# Patient Record
Sex: FEMALE | Race: WHITE | NOT HISPANIC OR LATINO | Employment: UNEMPLOYED | URBAN - METROPOLITAN AREA
[De-identification: names, ages, dates, MRNs, and addresses within clinical notes are randomized per-mention and may not be internally consistent; named-entity substitution may affect disease eponyms.]

---

## 2023-03-25 DIAGNOSIS — E03.9 HYPOTHYROIDISM, UNSPECIFIED: ICD-10-CM

## 2023-04-12 PROBLEM — R23.4 OILY SKIN: Status: ACTIVE | Noted: 2023-04-12

## 2023-04-12 PROBLEM — L98.9 SKIN LESION: Status: ACTIVE | Noted: 2023-04-12

## 2023-04-12 PROBLEM — F41.8 DEPRESSION WITH ANXIETY: Status: ACTIVE | Noted: 2023-04-12

## 2023-04-12 PROBLEM — M79.651 PAIN OF RIGHT THIGH: Status: ACTIVE | Noted: 2023-04-12

## 2023-04-12 PROBLEM — J45.909 MILD ASTHMA (HHS-HCC): Status: ACTIVE | Noted: 2023-04-12

## 2023-04-12 PROBLEM — G47.00 INSOMNIA: Status: ACTIVE | Noted: 2023-04-12

## 2023-04-12 PROBLEM — M54.16 LUMBAR RADICULITIS: Status: ACTIVE | Noted: 2023-04-12

## 2023-04-12 PROBLEM — R93.7 ABNORMAL X-RAY OF LUMBAR SPINE: Status: ACTIVE | Noted: 2023-04-12

## 2023-04-12 PROBLEM — G47.33 OSA (OBSTRUCTIVE SLEEP APNEA): Status: ACTIVE | Noted: 2023-04-12

## 2023-04-12 PROBLEM — G25.89 SCAPULAR DYSKINESIS: Status: ACTIVE | Noted: 2023-04-12

## 2023-04-12 PROBLEM — R53.82 CHRONIC FATIGUE: Status: ACTIVE | Noted: 2023-04-12

## 2023-04-12 PROBLEM — R29.898 SHOULDER WEAKNESS: Status: ACTIVE | Noted: 2023-04-12

## 2023-04-12 PROBLEM — M25.512 LEFT SHOULDER PAIN: Status: ACTIVE | Noted: 2023-04-12

## 2023-04-12 PROBLEM — R29.898 COMPLAINTS OF WEAKNESS OF LOWER EXTREMITY: Status: ACTIVE | Noted: 2023-04-12

## 2023-04-12 PROBLEM — M47.816 LUMBAR SPONDYLOSIS: Status: ACTIVE | Noted: 2023-04-12

## 2023-04-12 PROBLEM — E78.5 HYPERLIPIDEMIA: Status: ACTIVE | Noted: 2023-04-12

## 2023-04-12 PROBLEM — E03.9 HYPOTHYROID: Status: ACTIVE | Noted: 2023-04-12

## 2023-04-12 PROBLEM — E66.9 OBESITY: Status: ACTIVE | Noted: 2023-04-12

## 2023-04-12 PROBLEM — E55.9 VITAMIN D DEFICIENCY: Status: ACTIVE | Noted: 2023-04-12

## 2023-04-12 PROBLEM — R14.0 ABDOMINAL BLOATING: Status: ACTIVE | Noted: 2023-04-12

## 2023-04-12 PROBLEM — E03.9 HYPOTHYROIDISM: Status: ACTIVE | Noted: 2023-04-12

## 2023-04-12 RX ORDER — LEVOTHYROXINE SODIUM 75 UG/1
1 TABLET ORAL
COMMUNITY
Start: 2016-11-20 | End: 2023-10-25 | Stop reason: SDUPTHER

## 2023-04-12 RX ORDER — FLUTICASONE PROPIONATE AND SALMETEROL 250; 50 UG/1; UG/1
1 POWDER RESPIRATORY (INHALATION)
COMMUNITY
Start: 2021-10-25

## 2023-04-12 RX ORDER — ROSUVASTATIN CALCIUM 20 MG/1
1 TABLET, COATED ORAL DAILY
COMMUNITY
Start: 2023-01-13 | End: 2023-07-17

## 2023-04-12 RX ORDER — ZOLPIDEM TARTRATE 6.25 MG/1
1 TABLET, FILM COATED, EXTENDED RELEASE ORAL NIGHTLY
COMMUNITY
Start: 2021-02-18 | End: 2023-04-13 | Stop reason: SDUPTHER

## 2023-04-12 RX ORDER — METHOCARBAMOL 500 MG/1
1-2 TABLET, FILM COATED ORAL EVERY 8 HOURS PRN
COMMUNITY
Start: 2023-03-25 | End: 2024-02-15 | Stop reason: ALTCHOICE

## 2023-04-12 RX ORDER — BUPROPION HYDROCHLORIDE 150 MG/1
1 TABLET ORAL
COMMUNITY
Start: 2020-10-12 | End: 2023-07-28 | Stop reason: SDUPTHER

## 2023-04-13 ENCOUNTER — OFFICE VISIT (OUTPATIENT)
Dept: PRIMARY CARE | Facility: CLINIC | Age: 63
End: 2023-04-13
Payer: COMMERCIAL

## 2023-04-13 VITALS
HEIGHT: 63 IN | SYSTOLIC BLOOD PRESSURE: 133 MMHG | BODY MASS INDEX: 32.67 KG/M2 | DIASTOLIC BLOOD PRESSURE: 82 MMHG | WEIGHT: 184.4 LBS | HEART RATE: 73 BPM | TEMPERATURE: 98.3 F

## 2023-04-13 DIAGNOSIS — E55.9 VITAMIN D DEFICIENCY: ICD-10-CM

## 2023-04-13 DIAGNOSIS — R53.82 CHRONIC FATIGUE: ICD-10-CM

## 2023-04-13 DIAGNOSIS — G47.33 OSA (OBSTRUCTIVE SLEEP APNEA): ICD-10-CM

## 2023-04-13 DIAGNOSIS — J45.20 MILD INTERMITTENT ASTHMA WITHOUT COMPLICATION (HHS-HCC): ICD-10-CM

## 2023-04-13 DIAGNOSIS — F51.01 PRIMARY INSOMNIA: Primary | ICD-10-CM

## 2023-04-13 DIAGNOSIS — E78.5 HYPERLIPIDEMIA, UNSPECIFIED HYPERLIPIDEMIA TYPE: ICD-10-CM

## 2023-04-13 DIAGNOSIS — E03.9 HYPOTHYROIDISM, UNSPECIFIED TYPE: ICD-10-CM

## 2023-04-13 PROCEDURE — 99214 OFFICE O/P EST MOD 30 MIN: CPT | Performed by: FAMILY MEDICINE

## 2023-04-13 PROCEDURE — 1036F TOBACCO NON-USER: CPT | Performed by: FAMILY MEDICINE

## 2023-04-13 RX ORDER — ZOLPIDEM TARTRATE 6.25 MG/1
6.25 TABLET, FILM COATED, EXTENDED RELEASE ORAL NIGHTLY
Qty: 30 TABLET | Refills: 2 | Status: SHIPPED | OUTPATIENT
Start: 2023-04-13 | End: 2023-07-28 | Stop reason: SDUPTHER

## 2023-04-13 ASSESSMENT — PATIENT HEALTH QUESTIONNAIRE - PHQ9
1. LITTLE INTEREST OR PLEASURE IN DOING THINGS: NOT AT ALL
2. FEELING DOWN, DEPRESSED OR HOPELESS: NOT AT ALL
SUM OF ALL RESPONSES TO PHQ9 QUESTIONS 1 & 2: 0

## 2023-04-13 ASSESSMENT — LIFESTYLE VARIABLES
HOW MANY STANDARD DRINKS CONTAINING ALCOHOL DO YOU HAVE ON A TYPICAL DAY: PATIENT DOES NOT DRINK
AUDIT-C TOTAL SCORE: 0
HOW OFTEN DO YOU HAVE A DRINK CONTAINING ALCOHOL: NEVER
HOW OFTEN DO YOU HAVE SIX OR MORE DRINKS ON ONE OCCASION: NEVER
SKIP TO QUESTIONS 9-10: 1

## 2023-04-13 ASSESSMENT — ENCOUNTER SYMPTOMS
OCCASIONAL FEELINGS OF UNSTEADINESS: 0
LOSS OF SENSATION IN FEET: 0
DEPRESSION: 0

## 2023-04-13 NOTE — PROGRESS NOTES
"Subjective   Patient ID: Deena Bello is a 62 y.o. female who presents for Follow-up (Here for a 3 mo fuv).    HPI   She denies any issues with her medications    She complains of snoring. She has tried a CPAP however, she has not found a mask that does not slide off her face.  She wakes up and her throat is raw. She is fatigued during the day. She is willing to see sleep medicine to see if can help    She continues on Zolpidem as it is controlling her symptoms well. She does not voice any side effects. She wakes up refreshed, not groggy but gets tired quickly during the day    Review of Systems    Objective   /82   Pulse 73   Temp 36.8 °C (98.3 °F)   Ht 1.6 m (5' 3\")   Wt 83.6 kg (184 lb 6.4 oz)   BMI 32.66 kg/m²     Physical Exam  Constitutional:       Appearance: Normal appearance.   Cardiovascular:      Rate and Rhythm: Normal rate and regular rhythm.      Pulses: Normal pulses.      Heart sounds: Normal heart sounds.   Pulmonary:      Effort: Pulmonary effort is normal.      Breath sounds: Normal breath sounds.   Musculoskeletal:         General: No tenderness. Normal range of motion.      Cervical back: Normal range of motion and neck supple.   Skin:     General: Skin is warm and dry.   Neurological:      Mental Status: She is alert and oriented to person, place, and time.   Psychiatric:         Mood and Affect: Mood normal.         Thought Content: Thought content normal.         Judgment: Judgment normal.         Assessment/Plan   Problem List Items Addressed This Visit          Nervous    Insomnia - Primary    Relevant Medications    zolpidem CR (Ambien CR) 6.25 mg ER tablet  Continue on Zolpidem 6.25 mg daily for sleep. Prescription printed.   OARRS checked. Risk and benefit ratio assessed. No Suspicious activity.   Referred to sleep medicine for salma      SALMA (obstructive sleep apnea)    Relevant Orders    Referral to Adult Sleep Medicine       Respiratory    Mild asthma  Controlled, no s/s or " exacerbations       Endocrine/Metabolic    Hypothyroid    Relevant Orders    CBC and Auto Differential    TSH with reflex to Free T4 if abnormal    Vitamin D deficiency    Relevant Orders    Vitamin D, Total       Other    Chronic fatigue    Hyperlipidemia    Relevant Orders    Comprehensive Metabolic Panel    Lipid Panel   Health Maintenance  Provided order for 6 month labs with fasting    Follow up in 3 months, unless a visit is needed prior.      Scribe Attestation  By signing my name below, IChaya Scribe   attest that this documentation has been prepared under the direction and in the presence of Petra Yoon DO.

## 2023-04-13 NOTE — PATIENT INSTRUCTIONS
Sleep Apnea  Provided referral to sleep medicine.     Insomnia  Continue on Zolpidem 6.25 mg daily for sleep. Prescription printed.   OARRS checked. Risk and benefit ratio assessed. No Suspicious activity.     Health Maintenance  Provided order for 6 month labs with fasting

## 2023-05-05 RX ORDER — LEVOTHYROXINE SODIUM 75 UG/1
TABLET ORAL
Qty: 45 TABLET | OUTPATIENT
Start: 2023-05-05

## 2023-05-22 DIAGNOSIS — F41.8 OTHER SPECIFIED ANXIETY DISORDERS: ICD-10-CM

## 2023-05-22 RX ORDER — BUPROPION HYDROCHLORIDE 150 MG/1
TABLET ORAL
Qty: 90 TABLET | Refills: 1 | OUTPATIENT
Start: 2023-05-22

## 2023-07-12 DIAGNOSIS — E78.5 HYPERLIPIDEMIA, UNSPECIFIED: ICD-10-CM

## 2023-07-17 RX ORDER — ROSUVASTATIN CALCIUM 20 MG/1
TABLET, COATED ORAL
Qty: 90 TABLET | Refills: 0 | Status: SHIPPED | OUTPATIENT
Start: 2023-07-17 | End: 2023-07-28 | Stop reason: SDUPTHER

## 2023-07-28 ENCOUNTER — OFFICE VISIT (OUTPATIENT)
Dept: PRIMARY CARE | Facility: CLINIC | Age: 63
End: 2023-07-28
Payer: COMMERCIAL

## 2023-07-28 VITALS
BODY MASS INDEX: 32.95 KG/M2 | OXYGEN SATURATION: 95 % | TEMPERATURE: 98.2 F | HEART RATE: 95 BPM | DIASTOLIC BLOOD PRESSURE: 82 MMHG | SYSTOLIC BLOOD PRESSURE: 137 MMHG | WEIGHT: 186 LBS

## 2023-07-28 DIAGNOSIS — E78.5 HYPERLIPIDEMIA, UNSPECIFIED: ICD-10-CM

## 2023-07-28 DIAGNOSIS — J45.20 MILD INTERMITTENT ASTHMA WITHOUT COMPLICATION (HHS-HCC): ICD-10-CM

## 2023-07-28 DIAGNOSIS — F41.8 DEPRESSION WITH ANXIETY: ICD-10-CM

## 2023-07-28 DIAGNOSIS — E03.9 HYPOTHYROIDISM, UNSPECIFIED TYPE: ICD-10-CM

## 2023-07-28 DIAGNOSIS — E78.5 HYPERLIPIDEMIA, UNSPECIFIED HYPERLIPIDEMIA TYPE: ICD-10-CM

## 2023-07-28 DIAGNOSIS — F51.01 PRIMARY INSOMNIA: Primary | ICD-10-CM

## 2023-07-28 PROBLEM — J32.9 CHRONIC SINUSITIS: Status: ACTIVE | Noted: 2023-07-28

## 2023-07-28 PROBLEM — E28.319 PREMATURE MENOPAUSE: Status: ACTIVE | Noted: 2023-07-28

## 2023-07-28 PROBLEM — J04.0 ACUTE LARYNGITIS: Status: ACTIVE | Noted: 2023-07-28

## 2023-07-28 PROBLEM — F51.04 CHRONIC INSOMNIA: Status: ACTIVE | Noted: 2023-07-28

## 2023-07-28 PROBLEM — E78.00 PURE HYPERCHOLESTEROLEMIA: Status: ACTIVE | Noted: 2023-07-28

## 2023-07-28 PROBLEM — N87.9 CERVICAL DYSPLASIA: Status: ACTIVE | Noted: 2023-07-28

## 2023-07-28 PROBLEM — J30.89 OTHER ALLERGIC RHINITIS: Status: ACTIVE | Noted: 2023-07-28

## 2023-07-28 PROBLEM — R53.81 OTHER MALAISE: Status: ACTIVE | Noted: 2023-07-28

## 2023-07-28 PROBLEM — J45.909 ASTHMA (HHS-HCC): Status: ACTIVE | Noted: 2023-07-28

## 2023-07-28 PROCEDURE — 99214 OFFICE O/P EST MOD 30 MIN: CPT | Performed by: FAMILY MEDICINE

## 2023-07-28 PROCEDURE — 1036F TOBACCO NON-USER: CPT | Performed by: FAMILY MEDICINE

## 2023-07-28 RX ORDER — ROSUVASTATIN CALCIUM 20 MG/1
20 TABLET, COATED ORAL DAILY
Qty: 90 TABLET | Refills: 0 | Status: SHIPPED | OUTPATIENT
Start: 2023-07-28 | End: 2023-11-09 | Stop reason: SDUPTHER

## 2023-07-28 RX ORDER — BUPROPION HYDROCHLORIDE 150 MG/1
150 TABLET ORAL
Qty: 90 TABLET | Refills: 0 | Status: SHIPPED | OUTPATIENT
Start: 2023-07-28 | End: 2023-11-09 | Stop reason: SDUPTHER

## 2023-07-28 RX ORDER — ZOLPIDEM TARTRATE 6.25 MG/1
6.25 TABLET, FILM COATED, EXTENDED RELEASE ORAL NIGHTLY
Qty: 30 TABLET | Refills: 2 | Status: SHIPPED | OUTPATIENT
Start: 2023-07-28 | End: 2023-10-27 | Stop reason: SDUPTHER

## 2023-07-28 ASSESSMENT — ENCOUNTER SYMPTOMS
DEPRESSION: 0
LOSS OF SENSATION IN FEET: 0
OCCASIONAL FEELINGS OF UNSTEADINESS: 0

## 2023-07-28 ASSESSMENT — PATIENT HEALTH QUESTIONNAIRE - PHQ9
SUM OF ALL RESPONSES TO PHQ9 QUESTIONS 1 AND 2: 0
1. LITTLE INTEREST OR PLEASURE IN DOING THINGS: NOT AT ALL
2. FEELING DOWN, DEPRESSED OR HOPELESS: NOT AT ALL

## 2023-07-28 NOTE — PROGRESS NOTES
Subjective   Patient ID: Deena Bello is a 62 y.o. female who presents for 3 MO FUV.    HPI here for med refill.  Has not done labs yet.  Will get fasting next week    Needs zolpidem CR refilled.  Sleeps about 4-6 hours uninterupted w medication.  Sometimes takes long to go back to sleep.  Most mornings feels refreshed     Anxiety and depression s/s in remission    Tolerating statin well, has controlled lipids in the past.      good energy level w thyroid med, will wait to fill when get labs back    Sees Dr Benz for gyn.  Needs mamm  Review of Systems    Objective   /82   Pulse 95   Temp 36.8 °C (98.2 °F)   Wt 84.4 kg (186 lb)   SpO2 95%   BMI 32.95 kg/m²     Physical Exam  Constitutional:       Appearance: Normal appearance.   HENT:      Head: Normocephalic.   Neck:      Vascular: No carotid bruit.   Cardiovascular:      Rate and Rhythm: Normal rate and regular rhythm.      Pulses: Normal pulses.      Heart sounds: Normal heart sounds.   Pulmonary:      Effort: Pulmonary effort is normal.      Breath sounds: Normal breath sounds.   Musculoskeletal:         General: Normal range of motion.      Right lower leg: No edema.      Left lower leg: No edema.   Skin:     General: Skin is warm and dry.   Neurological:      Mental Status: She is alert and oriented to person, place, and time.   Psychiatric:         Mood and Affect: Mood normal.         Thought Content: Thought content normal.         Judgment: Judgment normal.         Assessment/Plan   Problem List Items Addressed This Visit       Depression with anxiety    Relevant Medications    buPROPion XL (Wellbutrin XL) 150 mg 24 hr tablet    Hyperlipidemia    Hypothyroid    Insomnia - Primary    Relevant Medications    zolpidem CR (Ambien CR) 6.25 mg ER tablet    Mild asthma     Other Visit Diagnoses       Hyperlipidemia, unspecified        Relevant Medications    rosuvastatin (Crestor) 20 mg tablet        Refilled zolpidem cr, discussed good sleep  hygiene, reviewed OARRs, no signs of diversion or overuse of meds.  Pt is benefitting from med w/o siode effects  CSA/UDS jan 2023    Refilled all other meds except thyroid, waiting for labs and then send thyroid med as well pending results.      She will call dr Benz for mamm order and follow up for next pap etc    Follow up here 6  months or prn sooner

## 2023-07-29 ENCOUNTER — LAB (OUTPATIENT)
Dept: LAB | Facility: LAB | Age: 63
End: 2023-07-29
Payer: COMMERCIAL

## 2023-07-29 DIAGNOSIS — E78.5 HYPERLIPIDEMIA, UNSPECIFIED HYPERLIPIDEMIA TYPE: ICD-10-CM

## 2023-07-29 DIAGNOSIS — E03.9 HYPOTHYROIDISM, UNSPECIFIED TYPE: ICD-10-CM

## 2023-07-29 DIAGNOSIS — E55.9 VITAMIN D DEFICIENCY: ICD-10-CM

## 2023-07-29 LAB
ALANINE AMINOTRANSFERASE (SGPT) (U/L) IN SER/PLAS: 18 U/L (ref 7–45)
ALBUMIN (G/DL) IN SER/PLAS: 4.7 G/DL (ref 3.4–5)
ALKALINE PHOSPHATASE (U/L) IN SER/PLAS: 64 U/L (ref 33–136)
ANION GAP IN SER/PLAS: 13 MMOL/L (ref 10–20)
ASPARTATE AMINOTRANSFERASE (SGOT) (U/L) IN SER/PLAS: 18 U/L (ref 9–39)
BASOPHILS (10*3/UL) IN BLOOD BY AUTOMATED COUNT: 0.05 X10E9/L (ref 0–0.1)
BASOPHILS/100 LEUKOCYTES IN BLOOD BY AUTOMATED COUNT: 1.2 % (ref 0–2)
BILIRUBIN TOTAL (MG/DL) IN SER/PLAS: 0.5 MG/DL (ref 0–1.2)
CALCIDIOL (25 OH VITAMIN D3) (NG/ML) IN SER/PLAS: 38 NG/ML
CALCIUM (MG/DL) IN SER/PLAS: 9.8 MG/DL (ref 8.6–10.6)
CARBON DIOXIDE, TOTAL (MMOL/L) IN SER/PLAS: 27 MMOL/L (ref 21–32)
CHLORIDE (MMOL/L) IN SER/PLAS: 104 MMOL/L (ref 98–107)
CHOLESTEROL (MG/DL) IN SER/PLAS: 149 MG/DL (ref 0–199)
CHOLESTEROL IN HDL (MG/DL) IN SER/PLAS: 62.1 MG/DL
CHOLESTEROL/HDL RATIO: 2.4
CREATININE (MG/DL) IN SER/PLAS: 0.89 MG/DL (ref 0.5–1.05)
EOSINOPHILS (10*3/UL) IN BLOOD BY AUTOMATED COUNT: 0.55 X10E9/L (ref 0–0.7)
EOSINOPHILS/100 LEUKOCYTES IN BLOOD BY AUTOMATED COUNT: 12.8 % (ref 0–6)
ERYTHROCYTE DISTRIBUTION WIDTH (RATIO) BY AUTOMATED COUNT: 13.7 % (ref 11.5–14.5)
ERYTHROCYTE MEAN CORPUSCULAR HEMOGLOBIN CONCENTRATION (G/DL) BY AUTOMATED: 32.4 G/DL (ref 32–36)
ERYTHROCYTE MEAN CORPUSCULAR VOLUME (FL) BY AUTOMATED COUNT: 97 FL (ref 80–100)
ERYTHROCYTES (10*6/UL) IN BLOOD BY AUTOMATED COUNT: 4.55 X10E12/L (ref 4–5.2)
GFR FEMALE: 74 ML/MIN/1.73M2
GLUCOSE (MG/DL) IN SER/PLAS: 95 MG/DL (ref 74–99)
HEMATOCRIT (%) IN BLOOD BY AUTOMATED COUNT: 44.1 % (ref 36–46)
HEMOGLOBIN (G/DL) IN BLOOD: 14.3 G/DL (ref 12–16)
IMMATURE GRANULOCYTES/100 LEUKOCYTES IN BLOOD BY AUTOMATED COUNT: 0.2 % (ref 0–0.9)
LDL: 74 MG/DL (ref 0–99)
LEUKOCYTES (10*3/UL) IN BLOOD BY AUTOMATED COUNT: 4.3 X10E9/L (ref 4.4–11.3)
LYMPHOCYTES (10*3/UL) IN BLOOD BY AUTOMATED COUNT: 1.3 X10E9/L (ref 1.2–4.8)
LYMPHOCYTES/100 LEUKOCYTES IN BLOOD BY AUTOMATED COUNT: 30.2 % (ref 13–44)
MONOCYTES (10*3/UL) IN BLOOD BY AUTOMATED COUNT: 0.33 X10E9/L (ref 0.1–1)
MONOCYTES/100 LEUKOCYTES IN BLOOD BY AUTOMATED COUNT: 7.7 % (ref 2–10)
NEUTROPHILS (10*3/UL) IN BLOOD BY AUTOMATED COUNT: 2.06 X10E9/L (ref 1.2–7.7)
NEUTROPHILS/100 LEUKOCYTES IN BLOOD BY AUTOMATED COUNT: 47.9 % (ref 40–80)
NRBC (PER 100 WBCS) BY AUTOMATED COUNT: 0 /100 WBC (ref 0–0)
PLATELETS (10*3/UL) IN BLOOD AUTOMATED COUNT: 279 X10E9/L (ref 150–450)
POTASSIUM (MMOL/L) IN SER/PLAS: 4.5 MMOL/L (ref 3.5–5.3)
PROTEIN TOTAL: 7 G/DL (ref 6.4–8.2)
SODIUM (MMOL/L) IN SER/PLAS: 139 MMOL/L (ref 136–145)
THYROTROPIN (MIU/L) IN SER/PLAS BY DETECTION LIMIT <= 0.05 MIU/L: 0.47 MIU/L (ref 0.44–3.98)
TRIGLYCERIDE (MG/DL) IN SER/PLAS: 67 MG/DL (ref 0–149)
UREA NITROGEN (MG/DL) IN SER/PLAS: 12 MG/DL (ref 6–23)
VLDL: 13 MG/DL (ref 0–40)

## 2023-07-29 PROCEDURE — 84443 ASSAY THYROID STIM HORMONE: CPT

## 2023-07-29 PROCEDURE — 80061 LIPID PANEL: CPT

## 2023-07-29 PROCEDURE — 36415 COLL VENOUS BLD VENIPUNCTURE: CPT

## 2023-07-29 PROCEDURE — 85025 COMPLETE CBC W/AUTO DIFF WBC: CPT

## 2023-07-29 PROCEDURE — 82306 VITAMIN D 25 HYDROXY: CPT

## 2023-07-29 PROCEDURE — 80053 COMPREHEN METABOLIC PANEL: CPT

## 2023-10-20 DIAGNOSIS — F51.01 PRIMARY INSOMNIA: ICD-10-CM

## 2023-10-25 DIAGNOSIS — E03.9 HYPOTHYROIDISM, UNSPECIFIED TYPE: Primary | ICD-10-CM

## 2023-10-25 RX ORDER — LEVOTHYROXINE SODIUM 75 UG/1
75 TABLET ORAL
Qty: 90 TABLET | Refills: 2 | Status: SHIPPED | OUTPATIENT
Start: 2023-10-25 | End: 2023-11-09 | Stop reason: SDUPTHER

## 2023-10-26 RX ORDER — ZOLPIDEM TARTRATE 6.25 MG/1
6.25 TABLET, FILM COATED, EXTENDED RELEASE ORAL NIGHTLY
Qty: 30 TABLET | Refills: 2 | OUTPATIENT
Start: 2023-10-26 | End: 2024-01-24

## 2023-10-27 DIAGNOSIS — F41.8 DEPRESSION WITH ANXIETY: ICD-10-CM

## 2023-10-27 DIAGNOSIS — F51.01 PRIMARY INSOMNIA: ICD-10-CM

## 2023-10-27 RX ORDER — BUPROPION HYDROCHLORIDE 150 MG/1
150 TABLET ORAL
Qty: 90 TABLET | Refills: 0 | OUTPATIENT
Start: 2023-10-27 | End: 2024-01-25

## 2023-10-27 RX ORDER — ZOLPIDEM TARTRATE 6.25 MG/1
6.25 TABLET, FILM COATED, EXTENDED RELEASE ORAL NIGHTLY
Qty: 14 TABLET | Refills: 0 | Status: SHIPPED | OUTPATIENT
Start: 2023-10-27 | End: 2023-11-09 | Stop reason: SDUPTHER

## 2023-10-27 NOTE — TELEPHONE ENCOUNTER
On patients allergy list that she can not take this med. Spoke to patient she is not taking medicine Wellbutrin anymore

## 2023-11-09 ENCOUNTER — OFFICE VISIT (OUTPATIENT)
Dept: PRIMARY CARE | Facility: CLINIC | Age: 63
End: 2023-11-09
Payer: COMMERCIAL

## 2023-11-09 VITALS
RESPIRATION RATE: 16 BRPM | SYSTOLIC BLOOD PRESSURE: 128 MMHG | OXYGEN SATURATION: 94 % | HEIGHT: 63 IN | BODY MASS INDEX: 33.56 KG/M2 | WEIGHT: 189.4 LBS | HEART RATE: 73 BPM | DIASTOLIC BLOOD PRESSURE: 70 MMHG

## 2023-11-09 DIAGNOSIS — E78.5 HYPERLIPIDEMIA, UNSPECIFIED: ICD-10-CM

## 2023-11-09 DIAGNOSIS — E03.9 HYPOTHYROIDISM, UNSPECIFIED TYPE: ICD-10-CM

## 2023-11-09 DIAGNOSIS — E78.2 MIXED HYPERLIPIDEMIA: ICD-10-CM

## 2023-11-09 DIAGNOSIS — J45.20 MILD INTERMITTENT ASTHMA WITHOUT COMPLICATION (HHS-HCC): ICD-10-CM

## 2023-11-09 DIAGNOSIS — F41.8 DEPRESSION WITH ANXIETY: ICD-10-CM

## 2023-11-09 DIAGNOSIS — D72.819 LEUKOPENIA, UNSPECIFIED TYPE: Primary | ICD-10-CM

## 2023-11-09 DIAGNOSIS — F51.01 PRIMARY INSOMNIA: ICD-10-CM

## 2023-11-09 PROCEDURE — 99214 OFFICE O/P EST MOD 30 MIN: CPT | Performed by: FAMILY MEDICINE

## 2023-11-09 PROCEDURE — 1036F TOBACCO NON-USER: CPT | Performed by: FAMILY MEDICINE

## 2023-11-09 RX ORDER — ROSUVASTATIN CALCIUM 20 MG/1
20 TABLET, COATED ORAL DAILY
Qty: 90 TABLET | Refills: 0 | Status: SHIPPED | OUTPATIENT
Start: 2023-11-09 | End: 2024-02-15 | Stop reason: SDUPTHER

## 2023-11-09 RX ORDER — LEVOTHYROXINE SODIUM 75 UG/1
75 TABLET ORAL
Qty: 90 TABLET | Refills: 0 | Status: SHIPPED | OUTPATIENT
Start: 2023-11-09 | End: 2024-02-15 | Stop reason: SDUPTHER

## 2023-11-09 RX ORDER — BUPROPION HYDROCHLORIDE 150 MG/1
150 TABLET ORAL
Qty: 90 TABLET | Refills: 0 | Status: SHIPPED | OUTPATIENT
Start: 2023-11-09 | End: 2024-02-07

## 2023-11-09 RX ORDER — ZOLPIDEM TARTRATE 6.25 MG/1
6.25 TABLET, FILM COATED, EXTENDED RELEASE ORAL NIGHTLY
Qty: 30 TABLET | Refills: 2 | Status: SHIPPED | OUTPATIENT
Start: 2023-11-09 | End: 2024-02-08 | Stop reason: SDUPTHER

## 2023-11-09 ASSESSMENT — COLUMBIA-SUICIDE SEVERITY RATING SCALE - C-SSRS
6. HAVE YOU EVER DONE ANYTHING, STARTED TO DO ANYTHING, OR PREPARED TO DO ANYTHING TO END YOUR LIFE?: NO
2. HAVE YOU ACTUALLY HAD ANY THOUGHTS OF KILLING YOURSELF?: NO
1. IN THE PAST MONTH, HAVE YOU WISHED YOU WERE DEAD OR WISHED YOU COULD GO TO SLEEP AND NOT WAKE UP?: NO

## 2023-11-09 ASSESSMENT — ENCOUNTER SYMPTOMS
DEPRESSION: 0
LOSS OF SENSATION IN FEET: 0
OCCASIONAL FEELINGS OF UNSTEADINESS: 0

## 2023-11-09 ASSESSMENT — PATIENT HEALTH QUESTIONNAIRE - PHQ9
2. FEELING DOWN, DEPRESSED OR HOPELESS: NOT AT ALL
SUM OF ALL RESPONSES TO PHQ9 QUESTIONS 1 AND 2: 0
1. LITTLE INTEREST OR PLEASURE IN DOING THINGS: NOT AT ALL

## 2023-11-09 NOTE — PROGRESS NOTES
"Subjective   Patient ID: Deena Bello is a 63 y.o. female who presents for Follow-up.    HPI   The patient presents to the clinic for a 3-month follow-up and medication refills. She has past medical history of hypothyroidism, depression with anxiety, and hyperlipidemia.    She has been compliant with medication and denies any side-effects to current medication.    The patient's most recent labs were done on 07/29/2023. Her most recent mammogram was done on 04/06/2022.    She reports that she was previously diagnosed with arthritis in her bilateral lower extremities. She states she feels \"circulation issues\" in her lower bilateral extremities. Specifically, she reports coldness and pressure in her lower bilateral extremities. She denies any whiteness in her extremities (characteristic of Raynaud's disease). She denies any back-related issues.    Bp good rnage.  No cp or sob    Review of Systems    Objective   /70 (BP Location: Right arm, Patient Position: Sitting, BP Cuff Size: Large adult)   Pulse 73   Resp 16   Ht 1.6 m (5' 3\")   Wt 85.9 kg (189 lb 6.4 oz)   SpO2 94%   BMI 33.55 kg/m²     Physical Exam  Neck:      Vascular: No carotid bruit.   Cardiovascular:      Rate and Rhythm: Normal rate and regular rhythm.      Pulses: Normal pulses.      Heart sounds: Normal heart sounds.      Comments: Dorsalis pedis and post tibial pulses strong and symmetrical bilat lower extremities and good capillary refill of nail beds  Pulmonary:      Effort: Pulmonary effort is normal.      Breath sounds: Normal breath sounds.   Musculoskeletal:         General: Normal range of motion.      Cervical back: Normal range of motion.      Right lower leg: No edema.      Left lower leg: No edema.   Skin:     General: Skin is warm and dry.   Neurological:      General: No focal deficit present.      Mental Status: She is alert and oriented to person, place, and time.   Psychiatric:         Mood and Affect: Mood normal.         " Behavior: Behavior normal.         Thought Content: Thought content normal.         Judgment: Judgment normal.         Assessment/Plan   Problem List Items Addressed This Visit             ICD-10-CM    Depression with anxiety F41.8    Relevant Medications    buPROPion XL (Wellbutrin XL) 150 mg 24 hr tablet    Hyperlipidemia E78.5    Relevant Orders    Comprehensive Metabolic Panel    Lipid Panel    Insomnia G47.00    Relevant Medications    zolpidem CR (Ambien CR) 6.25 mg ER tablet    Mild asthma J45.909    Hypothyroidism E03.9    Relevant Medications    levothyroxine (Synthroid, Levoxyl) 75 mcg tablet     Other Visit Diagnoses         Codes    Leukopenia, unspecified type    -  Primary D72.819    Relevant Orders    CBC and Auto Differential    Hyperlipidemia, unspecified     E78.5    Relevant Medications    rosuvastatin (Crestor) 20 mg tablet    Other Relevant Orders    Comprehensive Metabolic Panel    Lipid Panel               Labs (CBC, CMP, lipid panel) were ordered for the patient to complete before her next visit in January 2024.    The patient's current medication list was updated. She received refills for current medication (buproprion  mg, levothyroxine 75 mcg, rosuvastatin 20 mg, zolpidem 6.25 mg). She was advised to continue using medication as previously directed.      No circulation issues detected in feet, good capillary refill and good pulses.  No sensory deficit.  She will consider seeing podiatrist if s/s persist    She will follow-up in January 2024.    Scribe Attestation  By signing my name below, IMarah Scribe   attest that this documentation has been prepared under the direction and in the presence of Petra Yoon DO.

## 2024-01-06 ENCOUNTER — LAB (OUTPATIENT)
Dept: LAB | Facility: LAB | Age: 64
End: 2024-01-06
Payer: COMMERCIAL

## 2024-01-06 DIAGNOSIS — L70.0 ACNE VULGARIS: Primary | ICD-10-CM

## 2024-01-06 LAB
ALBUMIN SERPL BCP-MCNC: 4.3 G/DL (ref 3.4–5)
ALP SERPL-CCNC: 61 U/L (ref 33–136)
ALT SERPL W P-5'-P-CCNC: 18 U/L (ref 7–45)
AST SERPL W P-5'-P-CCNC: 17 U/L (ref 9–39)
BILIRUB DIRECT SERPL-MCNC: 0.1 MG/DL (ref 0–0.3)
BILIRUB SERPL-MCNC: 0.4 MG/DL (ref 0–1.2)
CHOLEST SERPL-MCNC: 155 MG/DL (ref 0–199)
ERYTHROCYTE [DISTWIDTH] IN BLOOD BY AUTOMATED COUNT: 13.5 % (ref 11.5–14.5)
HCT VFR BLD AUTO: 44.2 % (ref 36–46)
HGB BLD-MCNC: 14.8 G/DL (ref 12–16)
MCH RBC QN AUTO: 32.7 PG (ref 26–34)
MCHC RBC AUTO-ENTMCNC: 33.5 G/DL (ref 32–36)
MCV RBC AUTO: 98 FL (ref 80–100)
NRBC BLD-RTO: 0 /100 WBCS (ref 0–0)
PLATELET # BLD AUTO: 270 X10*3/UL (ref 150–450)
PROT SERPL-MCNC: 6.9 G/DL (ref 6.4–8.2)
RBC # BLD AUTO: 4.52 X10*6/UL (ref 4–5.2)
TRIGL SERPL-MCNC: 84 MG/DL (ref 0–149)
WBC # BLD AUTO: 4.4 X10*3/UL (ref 4.4–11.3)

## 2024-01-06 PROCEDURE — 84478 ASSAY OF TRIGLYCERIDES: CPT

## 2024-01-06 PROCEDURE — 85027 COMPLETE CBC AUTOMATED: CPT

## 2024-01-06 PROCEDURE — 80076 HEPATIC FUNCTION PANEL: CPT

## 2024-01-06 PROCEDURE — 36415 COLL VENOUS BLD VENIPUNCTURE: CPT

## 2024-01-06 PROCEDURE — 82465 ASSAY BLD/SERUM CHOLESTEROL: CPT

## 2024-02-05 DIAGNOSIS — F41.8 DEPRESSION WITH ANXIETY: ICD-10-CM

## 2024-02-07 RX ORDER — BUPROPION HYDROCHLORIDE 150 MG/1
150 TABLET ORAL
Qty: 30 TABLET | Refills: 0 | Status: SHIPPED | OUTPATIENT
Start: 2024-02-07 | End: 2024-02-15 | Stop reason: SDUPTHER

## 2024-02-08 DIAGNOSIS — F51.01 PRIMARY INSOMNIA: ICD-10-CM

## 2024-02-08 RX ORDER — ZOLPIDEM TARTRATE 6.25 MG/1
6.25 TABLET, FILM COATED, EXTENDED RELEASE ORAL NIGHTLY
Qty: 10 TABLET | Refills: 0 | Status: SHIPPED | OUTPATIENT
Start: 2024-02-08 | End: 2024-02-15 | Stop reason: SDUPTHER

## 2024-02-15 ENCOUNTER — OFFICE VISIT (OUTPATIENT)
Dept: PRIMARY CARE | Facility: CLINIC | Age: 64
End: 2024-02-15
Payer: COMMERCIAL

## 2024-02-15 VITALS
SYSTOLIC BLOOD PRESSURE: 147 MMHG | BODY MASS INDEX: 32.44 KG/M2 | TEMPERATURE: 97.3 F | WEIGHT: 190 LBS | OXYGEN SATURATION: 94 % | DIASTOLIC BLOOD PRESSURE: 89 MMHG | RESPIRATION RATE: 18 BRPM | HEART RATE: 74 BPM | HEIGHT: 64 IN

## 2024-02-15 DIAGNOSIS — G47.09 OTHER INSOMNIA: ICD-10-CM

## 2024-02-15 DIAGNOSIS — E03.9 HYPOTHYROIDISM, UNSPECIFIED TYPE: ICD-10-CM

## 2024-02-15 DIAGNOSIS — F51.01 PRIMARY INSOMNIA: Primary | ICD-10-CM

## 2024-02-15 DIAGNOSIS — R03.0 ELEVATED BLOOD PRESSURE READING: ICD-10-CM

## 2024-02-15 DIAGNOSIS — F41.8 DEPRESSION WITH ANXIETY: ICD-10-CM

## 2024-02-15 DIAGNOSIS — J45.20 MILD INTERMITTENT ASTHMA WITHOUT COMPLICATION (HHS-HCC): ICD-10-CM

## 2024-02-15 DIAGNOSIS — E78.5 HYPERLIPIDEMIA, UNSPECIFIED: ICD-10-CM

## 2024-02-15 LAB
AMPHETAMINES UR QL SCN: NORMAL
BARBITURATES UR QL SCN: NORMAL
BENZODIAZ UR QL SCN: NORMAL
BENZODIAZ UR QL SCN: NORMAL
BZE UR QL SCN: NORMAL
CANNABINOIDS UR QL SCN: NORMAL
FENTANYL+NORFENTANYL UR QL SCN: NORMAL
OPIATES UR QL SCN: NORMAL
OXYCODONE+OXYMORPHONE UR QL SCN: NORMAL
PCP UR QL SCN: NORMAL

## 2024-02-15 PROCEDURE — 1036F TOBACCO NON-USER: CPT | Performed by: FAMILY MEDICINE

## 2024-02-15 PROCEDURE — 99214 OFFICE O/P EST MOD 30 MIN: CPT | Performed by: FAMILY MEDICINE

## 2024-02-15 PROCEDURE — 80307 DRUG TEST PRSMV CHEM ANLYZR: CPT

## 2024-02-15 RX ORDER — LEVOTHYROXINE SODIUM 75 UG/1
75 TABLET ORAL
Qty: 90 TABLET | Refills: 0 | Status: SHIPPED | OUTPATIENT
Start: 2024-02-15 | End: 2024-05-16 | Stop reason: SDUPTHER

## 2024-02-15 RX ORDER — ISOTRETINOIN 10 MG/1
10 CAPSULE, LIQUID FILLED ORAL
COMMUNITY
Start: 2024-01-05

## 2024-02-15 RX ORDER — BUPROPION HYDROCHLORIDE 150 MG/1
150 TABLET ORAL
Qty: 90 TABLET | Refills: 1 | Status: SHIPPED | OUTPATIENT
Start: 2024-02-15

## 2024-02-15 RX ORDER — ROSUVASTATIN CALCIUM 20 MG/1
20 TABLET, COATED ORAL DAILY
Qty: 90 TABLET | Refills: 1 | Status: SHIPPED | OUTPATIENT
Start: 2024-02-15

## 2024-02-15 RX ORDER — KETOCONAZOLE 20 MG/G
CREAM TOPICAL 2 TIMES DAILY
COMMUNITY
Start: 2023-10-06 | End: 2024-02-15 | Stop reason: ALTCHOICE

## 2024-02-15 RX ORDER — ZOLPIDEM TARTRATE 6.25 MG/1
6.25 TABLET, FILM COATED, EXTENDED RELEASE ORAL NIGHTLY
Qty: 30 TABLET | Refills: 2 | Status: SHIPPED | OUTPATIENT
Start: 2024-02-15 | End: 2024-05-16 | Stop reason: SDUPTHER

## 2024-02-15 ASSESSMENT — PAIN SCALES - GENERAL: PAINLEVEL: 0-NO PAIN

## 2024-02-15 NOTE — PROGRESS NOTES
"Subjective   Patient ID: Deena Bello is a 63 y.o. female who presents for 4 month f/up.    HPI   The patient presents to the clinic for a 4-month follow-up. She has past medical history of hyperlipidemia, chronic sinusitis, hypothyroidism, depression, anxiety, lumbar radiculitis, lumbar spondylosis, asthma, chronic insomnia, chronic fatigue, and SALMA.    Her blood pressure (147/89) was slightly elevated when checked in the clinic today. She is not taking any prescription medication for hypertension at this time. She states that she does have a blood pressure monitor at home. Her blood pressure (142/88) remained elevated when re-checked in the clinic towards the end of the visit. She has family history of CHF with her mother.    She reports that she is taking Claravis 10 mg (3 times per week) under the guidance of a dermatologist (Dr. Melendez). Had TC and TG drawn and hepatic function panel so do not need to do labs for her statin at this time    Her most recent labs were done in July 2023. She continues on rosuvastatin 20 mg for treatment of hyperlipidemia. She states that this medication is working well and she denies any side-effects. She also continues on levothyroxine 75 mcg (8 tablets per week) for treatment of hypothyroidisim. She states that levothyroxine medication is working well and she denies any side-effects.    She also continues on bupropion  mg for treatment of depression/anxiety symptoms. She denies any side-effects to her bupropion XL prescription.     She continues to visit her OB-GYN (Dr. Benz) regularly. Her most recent mammogram screening was done in April 2022. Her most recent gynecological exam (Pap smear) was done in February 2022.  She is scheduled March 1    Continues on ambien CR, works well for her, denies side effects    Review of Systems    Objective   /89   Pulse 74   Temp 36.3 °C (97.3 °F)   Resp 18   Ht 1.613 m (5' 3.5\")   Wt 86.2 kg (190 lb)   SpO2 94%   BMI " 33.13 kg/m²     Physical Exam  Constitutional:       Appearance: Normal appearance.   Neck:      Vascular: No carotid bruit.   Cardiovascular:      Rate and Rhythm: Normal rate and regular rhythm.      Pulses: Normal pulses.      Heart sounds: Normal heart sounds.   Pulmonary:      Effort: Pulmonary effort is normal.      Breath sounds: Normal breath sounds.   Musculoskeletal:         General: Normal range of motion.      Cervical back: Normal range of motion.      Right lower leg: No edema.      Left lower leg: No edema.   Skin:     General: Skin is warm and dry.   Neurological:      Mental Status: She is alert and oriented to person, place, and time.   Psychiatric:         Mood and Affect: Mood normal.         Thought Content: Thought content normal.         Judgment: Judgment normal.         Assessment/Plan   Problem List Items Addressed This Visit             ICD-10-CM    Depression with anxiety F41.8    Relevant Medications    buPROPion XL (Wellbutrin XL) 150 mg 24 hr tablet    Insomnia - Primary G47.00    Relevant Medications    zolpidem CR (Ambien CR) 6.25 mg ER tablet    Other Relevant Orders    Drug Screen, Urine With Reflex to Confirmation    Benzodiazepine Screen, Urine    Hypothyroidism E03.9    Relevant Medications    levothyroxine (Synthroid, Levoxyl) 75 mcg tablet    Asthma J45.909     Stable, asymptomatic through winter, usually starts mdi in spring w allergies.            Other Visit Diagnoses         Codes    Hyperlipidemia, unspecified     E78.5    Relevant Medications    rosuvastatin (Crestor) 20 mg tablet    Elevated blood pressure reading     R03.0               Her medication list was updated. The patient received refills for current medication (bupropion  mg, levothyroxine 75 mcg, rosuvastatin 10 mg, zolpidem CR 6.25 mg). She was instructed to continue taking medication as previously directed. In regards to her zolpidem CR prescription, OARRS were reviewed. Risk and benefit ratio was  assessed. No suspicious activity was observed. A UDS/CSA was also conducted on the patient in the clinic today.    In regards to concerns with elevated blood pressure, the patient was advised to monitor her blood pressure at home for the next 2-3 weeks. She was advised to contact the clinic for further evaluation if her at-home BP readings are at/above 140/90. She was instructed to try eating a low-sodium diet with moderate amounts of aerobic exercise. Continue monitoring symptoms for improvement/exacerbation.    She will follow-up in 3 months, unless otherwise needed.    Scribe Attestation  By signing my name below, I, Nazia Ortiz   attest that this documentation has been prepared under the direction and in the presence of Petra Yoon DO.

## 2024-03-01 ENCOUNTER — OFFICE VISIT (OUTPATIENT)
Dept: OBSTETRICS AND GYNECOLOGY | Facility: CLINIC | Age: 64
End: 2024-03-01
Payer: COMMERCIAL

## 2024-03-01 VITALS
BODY MASS INDEX: 33.84 KG/M2 | DIASTOLIC BLOOD PRESSURE: 72 MMHG | WEIGHT: 191 LBS | SYSTOLIC BLOOD PRESSURE: 110 MMHG | HEIGHT: 63 IN

## 2024-03-01 DIAGNOSIS — Z12.31 BREAST CANCER SCREENING BY MAMMOGRAM: ICD-10-CM

## 2024-03-01 DIAGNOSIS — B37.2 YEAST DERMATITIS: ICD-10-CM

## 2024-03-01 DIAGNOSIS — Z01.419 WELL WOMAN EXAM WITH ROUTINE GYNECOLOGICAL EXAM: Primary | ICD-10-CM

## 2024-03-01 PROCEDURE — 1036F TOBACCO NON-USER: CPT | Performed by: OBSTETRICS & GYNECOLOGY

## 2024-03-01 PROCEDURE — 87624 HPV HI-RISK TYP POOLED RSLT: CPT

## 2024-03-01 PROCEDURE — 88175 CYTOPATH C/V AUTO FLUID REDO: CPT

## 2024-03-01 PROCEDURE — 99396 PREV VISIT EST AGE 40-64: CPT | Performed by: OBSTETRICS & GYNECOLOGY

## 2024-03-01 RX ORDER — CLOTRIMAZOLE AND BETAMETHASONE DIPROPIONATE 10; .64 MG/G; MG/G
1 CREAM TOPICAL 2 TIMES DAILY
Qty: 30 G | Refills: 0 | Status: SHIPPED | OUTPATIENT
Start: 2024-03-01 | End: 2024-04-30

## 2024-03-01 NOTE — PROGRESS NOTES
Chief Complaint   Patient presents with    Annual Exam     Annual Exam with Pap Smear and Order for Mammogram    [id number]    No complaints.    Chaperone declined.     Patient presents for annual preventative exam.  Last seen 2 years ago.  Has no gynecologic concerns other than gets intermittent rash which is itchy under the breasts.    REVIEW OF SYSTEMS    Please see HPI for reported pertinent positives, which would supersede this ROS    Constitutional:  Denies fever, chills, wt gain or loss, fatigue    Genito-Urinary:  Denies genital lesion or sores, vaginal dryness, itching  or pain.  No abnormal vaginal discharge or unexplained vaginal bleeding.  No dysuria, urinary incontinence or frequency.  Denies pelvic pain, dysmenorrhea or dyspareunia.    Eyes:  Denies vision changes, dryness  ENT:  No hearing loss, sinus pain or congestion, nosebleeds  Cardiovascular:  No chest pain or palpitations  Respiratory:  No SOB, cough, wheezing  GI:  No Nausea, vomiting, diarrhea, constipation, abdominal pain  Musculoskeletal:  No new back pain. joint pain, peripheral edema  Skin:  No rash or skin lesion  Neurologic:  No HA, numbness or dizziness  Psychiatric:  No new anxiety or depression  Endocrine:  No loss of hair or hirsutism  Hematologic/lymphatic:  No swollen lymph nodes.  No reported tendency for easy bruising or bleeding    Patient admits to no other systemic complaints      Vitals:    24 1406   BP: 110/72       PHYSICAL EXAM:    PSYCH:  Pt is alert, oriented and cooperative    SKIN: warm, dry, w/o lesion    HEAD AND FACE:  External inspection of eyes, ears, functional cranial nerves normal and intact    THYROID:  No thyromegaly    CARDIOVASCULAR:  Warm and well Perfused    PULMONARY:  No respiratory distress    ABDOMEN:  soft, nontender.  No mass or organomegaly.    Under The right breast is classic yeast dermatitis.  Left side not so much.  She states that she does get the symptoms intermittently from time to  time sometimes unilateral, sometimes both.    BREAST:  Breasts are symmetric to inspection and palpation.  No mass palpable bilaterally.  There is no axillary lymphadenopathy    PELVIC:    External genitalia, urethra, perianal region normal to inspection and palpation if indicated.  No inguinal LA    Vagina without lesions.    Cervix seen and visually normal      Bimanual exam:      No pelvic mass palpable    Uterus nontender, midline in pelvis    No adnexal masses or tenderness    Assessment/Plan    Diagnoses and all orders for this visit:  Well woman exam with routine gynecological exam  -     THINPREP PAP TEST  Breast cancer screening by mammogram  -     BI mammo bilateral screening tomosynthesis; Future  Yeast dermatitis  -     clotrimazole-betamethasone (Lotrisone) cream; Apply 1 Application topically 2 times a day.

## 2024-03-15 ENCOUNTER — HOSPITAL ENCOUNTER (OUTPATIENT)
Dept: RADIOLOGY | Facility: CLINIC | Age: 64
Discharge: HOME | End: 2024-03-15
Payer: COMMERCIAL

## 2024-03-15 VITALS — WEIGHT: 190.04 LBS | BODY MASS INDEX: 33.67 KG/M2 | HEIGHT: 63 IN

## 2024-03-15 DIAGNOSIS — Z12.31 BREAST CANCER SCREENING BY MAMMOGRAM: ICD-10-CM

## 2024-03-15 PROCEDURE — 77067 SCR MAMMO BI INCL CAD: CPT | Performed by: RADIOLOGY

## 2024-03-15 PROCEDURE — 77067 SCR MAMMO BI INCL CAD: CPT

## 2024-03-15 PROCEDURE — 77063 BREAST TOMOSYNTHESIS BI: CPT | Performed by: RADIOLOGY

## 2024-05-16 ENCOUNTER — OFFICE VISIT (OUTPATIENT)
Dept: PRIMARY CARE | Facility: CLINIC | Age: 64
End: 2024-05-16
Payer: COMMERCIAL

## 2024-05-16 VITALS
TEMPERATURE: 97.4 F | BODY MASS INDEX: 33.49 KG/M2 | HEIGHT: 63 IN | WEIGHT: 189 LBS | DIASTOLIC BLOOD PRESSURE: 78 MMHG | SYSTOLIC BLOOD PRESSURE: 118 MMHG | OXYGEN SATURATION: 95 % | RESPIRATION RATE: 18 BRPM | HEART RATE: 91 BPM

## 2024-05-16 DIAGNOSIS — F41.8 DEPRESSION WITH ANXIETY: ICD-10-CM

## 2024-05-16 DIAGNOSIS — J45.20 MILD INTERMITTENT ASTHMA WITHOUT COMPLICATION (HHS-HCC): ICD-10-CM

## 2024-05-16 DIAGNOSIS — F51.01 PRIMARY INSOMNIA: ICD-10-CM

## 2024-05-16 DIAGNOSIS — E78.5 HYPERLIPIDEMIA, UNSPECIFIED HYPERLIPIDEMIA TYPE: Primary | ICD-10-CM

## 2024-05-16 DIAGNOSIS — E03.9 HYPOTHYROIDISM, UNSPECIFIED TYPE: ICD-10-CM

## 2024-05-16 DIAGNOSIS — R03.0 ELEVATED BLOOD PRESSURE READING: ICD-10-CM

## 2024-05-16 PROCEDURE — 99214 OFFICE O/P EST MOD 30 MIN: CPT | Performed by: FAMILY MEDICINE

## 2024-05-16 PROCEDURE — 1036F TOBACCO NON-USER: CPT | Performed by: FAMILY MEDICINE

## 2024-05-16 RX ORDER — LEVOTHYROXINE SODIUM 75 UG/1
75 TABLET ORAL
Qty: 90 TABLET | Refills: 0 | Status: SHIPPED | OUTPATIENT
Start: 2024-05-16

## 2024-05-16 RX ORDER — ZOLPIDEM TARTRATE 6.25 MG/1
6.25 TABLET, FILM COATED, EXTENDED RELEASE ORAL NIGHTLY
Qty: 30 TABLET | Refills: 2 | Status: SHIPPED | OUTPATIENT
Start: 2024-05-16 | End: 2024-08-14

## 2024-05-16 ASSESSMENT — PAIN SCALES - GENERAL: PAINLEVEL: 0-NO PAIN

## 2024-05-16 NOTE — PROGRESS NOTES
"Subjective   Patient ID: Deena Bello is a 63 y.o. female who presents for 3 month f/up.    HPI   The patient presents to the clinic for a 3-month follow-up. She has past medical history of HLD, chronic sinusitis, hypothyroidism, vitamin D deficiency, cervical dysplasia, depression with anxiety, left shoulder pain, scapular dyskinesis, lumbar radiculitis, lumbar spondylosis, asthma, chronic insomnia, chronic fatigue, and SALMA.    She continues on rosuvastatin 20 mg daily for treatment of hyperlipidemia. She also continues on levothyroxine 75 mcg daily for treatment of hypothyroidism. She is tolerating both of these medications well. She denies any side-effects to her rosuvastatin medication and/or levothyroxine medication.     Notably, the patient inquires about having a CT cardiac scoring study for evaluation of progression of hyperlipidemia.    She takes bupropion  mg daily for treatment of depression/anxiety symptoms. Her symptoms have been controlled with bupropion XL medication. Her mood has been good.    She also takes zolpidem CR 6.25 mg nightly for treatment of chronic insomnia. She has been sleeping adequately while on this medication.    She is not taking her Advair at this time, no asthma s/s.  She has not needed rescue mdi much in winter and spring    Her blood pressure (118/78) was within normal range when checked in the clinic today. The patient states that she has been slightly more active recently (walking).  She was high last visit but has been normal since    She is up-to-date on mammogram screening (03/15/2024), gynecological exam (03/01/2024), and colonoscopy screening (01/27/2017, 10-year recall).    Review of Systems    Objective   /78   Pulse 91   Temp 36.3 °C (97.4 °F)   Resp 18   Ht 1.6 m (5' 3\")   Wt 85.7 kg (189 lb)   SpO2 95%   BMI 33.48 kg/m²     Physical Exam  Constitutional:       Appearance: Normal appearance.   Neck:      Vascular: No carotid bruit. "   Cardiovascular:      Rate and Rhythm: Normal rate and regular rhythm.      Pulses: Normal pulses.      Heart sounds: Normal heart sounds.   Pulmonary:      Effort: Pulmonary effort is normal.      Breath sounds: Normal breath sounds.   Abdominal:      Palpations: Abdomen is soft.   Musculoskeletal:         General: Normal range of motion.      Cervical back: Normal range of motion.      Right lower leg: No edema.      Left lower leg: No edema.   Skin:     General: Skin is warm and dry.   Neurological:      Mental Status: She is alert and oriented to person, place, and time.   Psychiatric:         Mood and Affect: Mood normal.         Thought Content: Thought content normal.         Judgment: Judgment normal.         Assessment/Plan   Problem List Items Addressed This Visit             ICD-10-CM    Depression with anxiety F41.8    Hyperlipidemia - Primary E78.5    Relevant Orders    CT cardiac scoring wo IV contrast    Comprehensive Metabolic Panel    Lipid Panel    Insomnia G47.00    Relevant Medications    zolpidem CR (Ambien CR) 6.25 mg ER tablet    Hypothyroidism E03.9    Relevant Medications    levothyroxine (Synthroid, Levoxyl) 75 mcg tablet    Other Relevant Orders    TSH with reflex to Free T4 if abnormal    Asthma ([facility]) J45.909     Other Visit Diagnoses         Codes    Elevated blood pressure reading     R03.0    Relevant Orders    CBC and Auto Differential               Labs (CBC, CMP, lipid panel, TSH) were ordered for the patient. She intends to complete her labs soon. The clinic will contact the patient upon receiving her lab results.    In regards to her hypothyroidism, the patient received a refill for her levothyroxine medication. She was instructed to continue taking levothyroxine 75 mcg daily as previously directed. She will be re-evaluated depending on her prospective repeat TSH result.    In regards to her hyperlipidemia, a CT cardiac scoring study was ordered for the patient in accordance  with her request. The clinic will contact the patient upon receiving her study results. For now, she was instructed to continue taking rosuvastatin 20 mg as previously directed. Continue monitoring symptoms for improvement/exacerbation.    Refilled meds needed.  Zolpidem refilled OARRs reviewed and no suspicious activity.  Pt is doing well on med w/o side effects and is getting good benefit from medication.  UDS/CSA on chart 2/2024    She will follow-up in 3 months, unless otherwise needed.    Scribe Attestation  By signing my name below, I, Nazia Ortiz   attest that this documentation has been prepared under the direction and in the presence of Petra Yoon DO.

## 2024-08-15 ENCOUNTER — APPOINTMENT (OUTPATIENT)
Dept: PRIMARY CARE | Facility: CLINIC | Age: 64
End: 2024-08-15
Payer: COMMERCIAL

## 2024-08-15 VITALS
DIASTOLIC BLOOD PRESSURE: 62 MMHG | HEART RATE: 88 BPM | RESPIRATION RATE: 18 BRPM | TEMPERATURE: 97.9 F | WEIGHT: 189 LBS | OXYGEN SATURATION: 93 % | SYSTOLIC BLOOD PRESSURE: 107 MMHG | HEIGHT: 63 IN | BODY MASS INDEX: 33.49 KG/M2

## 2024-08-15 DIAGNOSIS — E03.9 HYPOTHYROIDISM, UNSPECIFIED TYPE: ICD-10-CM

## 2024-08-15 DIAGNOSIS — J45.20 MILD INTERMITTENT ASTHMA WITHOUT COMPLICATION (HHS-HCC): ICD-10-CM

## 2024-08-15 DIAGNOSIS — F51.01 PRIMARY INSOMNIA: ICD-10-CM

## 2024-08-15 DIAGNOSIS — R53.82 CHRONIC FATIGUE: ICD-10-CM

## 2024-08-15 DIAGNOSIS — F41.8 DEPRESSION WITH ANXIETY: ICD-10-CM

## 2024-08-15 DIAGNOSIS — E78.5 HYPERLIPIDEMIA, UNSPECIFIED: Primary | ICD-10-CM

## 2024-08-15 PROCEDURE — 3008F BODY MASS INDEX DOCD: CPT | Performed by: FAMILY MEDICINE

## 2024-08-15 PROCEDURE — 99214 OFFICE O/P EST MOD 30 MIN: CPT | Performed by: FAMILY MEDICINE

## 2024-08-15 RX ORDER — BUPROPION HYDROCHLORIDE 150 MG/1
150 TABLET ORAL
Qty: 90 TABLET | Refills: 1 | Status: SHIPPED | OUTPATIENT
Start: 2024-08-15

## 2024-08-15 RX ORDER — ZOLPIDEM TARTRATE 6.25 MG/1
6.25 TABLET, FILM COATED, EXTENDED RELEASE ORAL NIGHTLY
Qty: 30 TABLET | Refills: 2 | Status: SHIPPED | OUTPATIENT
Start: 2024-08-15 | End: 2024-11-13

## 2024-08-15 RX ORDER — LEVOTHYROXINE SODIUM 75 UG/1
75 TABLET ORAL
Qty: 90 TABLET | Refills: 0 | Status: SHIPPED | OUTPATIENT
Start: 2024-08-15

## 2024-08-15 RX ORDER — ROSUVASTATIN CALCIUM 20 MG/1
20 TABLET, COATED ORAL DAILY
Qty: 90 TABLET | Refills: 1 | Status: SHIPPED | OUTPATIENT
Start: 2024-08-15

## 2024-08-15 ASSESSMENT — PATIENT HEALTH QUESTIONNAIRE - PHQ9
SUM OF ALL RESPONSES TO PHQ9 QUESTIONS 1 AND 2: 0
2. FEELING DOWN, DEPRESSED OR HOPELESS: NOT AT ALL
1. LITTLE INTEREST OR PLEASURE IN DOING THINGS: NOT AT ALL

## 2024-08-15 ASSESSMENT — PAIN SCALES - GENERAL: PAINLEVEL: 0-NO PAIN

## 2024-08-15 NOTE — PROGRESS NOTES
"Subjective   Patient ID: Deena Bello is a 63 y.o. female who presents for 3 month f/up.    HPI     Patient presents to the clinic for a 3-month follow-up. She has a past medical history of HDL, chronic sinusitis, hypothyroidism, Vitamin D deficiency, cervical dysplasia, depression with anxiety, left shoulder pain, scapular dyskinesis, lumbar radiculitis, lumbar spondylosis, asthma, chronic insomnia, chronic fatigue, and SALMA.     She is currently taking rosuvastatin 20 mg daily for treatment of hyperlipidemia and levothyroxine 75 mg for hypothyroidism. She does not remark any complications regarding using these drugs.     She has not been taking her Bupropion XL regularly used for her treatment of depression/anxiety symptoms as she states running out.  Works well and wants refilled    Has been taking zolpidem CR 6.25 mg nightly for treatment of chronic insomnia and does help greatly with sleep and needs refill    She comments on sinus issues last week but that they seemed to have improved. Regards that she has been tired recently. She wakes up refreshed but not enough energy to do what she wants during the day     Asthma has been controlled, no exacerbations    She has not done her fasting labs ordered in May. Her last thyroid was in 07/2023. She has her calcium score scheduled and has had her drug screening done 02/15/2024.     Review of Systems    Objective   /62   Pulse 88   Temp 36.6 °C (97.9 °F)   Resp 18   Ht 1.6 m (5' 3\")   Wt 85.7 kg (189 lb)   SpO2 93%   BMI 33.48 kg/m²     Physical Exam  Constitutional:       Appearance: Normal appearance.   Neck:      Vascular: No carotid bruit.   Cardiovascular:      Rate and Rhythm: Normal rate and regular rhythm.      Pulses: Normal pulses.      Heart sounds: Normal heart sounds.   Pulmonary:      Effort: Pulmonary effort is normal.      Breath sounds: Normal breath sounds.   Musculoskeletal:         General: Normal range of motion.      Cervical back: " Normal range of motion.      Right lower leg: No edema.      Left lower leg: No edema.   Skin:     General: Skin is warm and dry.   Neurological:      Mental Status: She is alert and oriented to person, place, and time.   Psychiatric:         Mood and Affect: Mood normal.         Thought Content: Thought content normal.         Judgment: Judgment normal.         Assessment/Plan   Problem List Items Addressed This Visit       Chronic fatigue    Depression with anxiety    Relevant Medications    buPROPion XL (Wellbutrin XL) 150 mg 24 hr tablet    Insomnia    Relevant Medications    zolpidem CR (Ambien CR) 6.25 mg ER tablet    Mild asthma ([facility])    Hypothyroidism    Relevant Medications    levothyroxine (Synthroid, Levoxyl) 75 mcg tablet     Other Visit Diagnoses       Hyperlipidemia, unspecified    -  Primary    Relevant Medications    rosuvastatin (Crestor) 20 mg tablet          Refilled medication as needed    Schedule fasting bloodwork, will call with results.  Once review labs may help evaluate fatigue, questtion if needs sleep medicine to evaluate SALMA    Will follow-up in 3 months, unless otherwise needed. Repeat labs 6 months    OARRs reviewed, utd w csa/uds.  No signs of misuse of medication or diversion, pt is is receiving benefit from medication    By signing my name below, I, Nazia Cisneros, attest that this documentation has been prepared under the direction and in the presence of Ang Lambert MD.

## 2024-08-24 ENCOUNTER — LAB (OUTPATIENT)
Dept: LAB | Facility: LAB | Age: 64
End: 2024-08-24
Payer: COMMERCIAL

## 2024-08-24 DIAGNOSIS — E78.5 HYPERLIPIDEMIA, UNSPECIFIED HYPERLIPIDEMIA TYPE: ICD-10-CM

## 2024-08-24 DIAGNOSIS — R03.0 ELEVATED BLOOD PRESSURE READING: ICD-10-CM

## 2024-08-24 DIAGNOSIS — E03.9 HYPOTHYROIDISM, UNSPECIFIED TYPE: ICD-10-CM

## 2024-08-24 PROCEDURE — 36415 COLL VENOUS BLD VENIPUNCTURE: CPT

## 2024-08-24 PROCEDURE — 80053 COMPREHEN METABOLIC PANEL: CPT

## 2024-08-24 PROCEDURE — 85025 COMPLETE CBC W/AUTO DIFF WBC: CPT

## 2024-08-24 PROCEDURE — 84443 ASSAY THYROID STIM HORMONE: CPT

## 2024-08-24 PROCEDURE — 84439 ASSAY OF FREE THYROXINE: CPT

## 2024-08-24 PROCEDURE — 80061 LIPID PANEL: CPT

## 2024-08-25 DIAGNOSIS — E03.9 HYPOTHYROIDISM, UNSPECIFIED TYPE: ICD-10-CM

## 2024-08-25 LAB
ALBUMIN SERPL BCP-MCNC: 4.4 G/DL (ref 3.4–5)
ALP SERPL-CCNC: 71 U/L (ref 33–136)
ALT SERPL W P-5'-P-CCNC: 15 U/L (ref 7–45)
ANION GAP SERPL CALC-SCNC: 14 MMOL/L (ref 10–20)
AST SERPL W P-5'-P-CCNC: 17 U/L (ref 9–39)
BASOPHILS # BLD AUTO: 0.04 X10*3/UL (ref 0–0.1)
BASOPHILS NFR BLD AUTO: 0.9 %
BILIRUB SERPL-MCNC: 0.4 MG/DL (ref 0–1.2)
BUN SERPL-MCNC: 12 MG/DL (ref 6–23)
CALCIUM SERPL-MCNC: 9.8 MG/DL (ref 8.6–10.6)
CHLORIDE SERPL-SCNC: 102 MMOL/L (ref 98–107)
CHOLEST SERPL-MCNC: 157 MG/DL (ref 0–199)
CHOLESTEROL/HDL RATIO: 2.6
CO2 SERPL-SCNC: 27 MMOL/L (ref 21–32)
CREAT SERPL-MCNC: 0.87 MG/DL (ref 0.5–1.05)
EGFRCR SERPLBLD CKD-EPI 2021: 76 ML/MIN/1.73M*2
EOSINOPHIL # BLD AUTO: 0.53 X10*3/UL (ref 0–0.7)
EOSINOPHIL NFR BLD AUTO: 12.1 %
ERYTHROCYTE [DISTWIDTH] IN BLOOD BY AUTOMATED COUNT: 13.4 % (ref 11.5–14.5)
GLUCOSE SERPL-MCNC: 97 MG/DL (ref 74–99)
HCT VFR BLD AUTO: 42.7 % (ref 36–46)
HDLC SERPL-MCNC: 60.8 MG/DL
HGB BLD-MCNC: 14.1 G/DL (ref 12–16)
IMM GRANULOCYTES # BLD AUTO: 0.01 X10*3/UL (ref 0–0.7)
IMM GRANULOCYTES NFR BLD AUTO: 0.2 % (ref 0–0.9)
LDLC SERPL CALC-MCNC: 76 MG/DL
LYMPHOCYTES # BLD AUTO: 1.57 X10*3/UL (ref 1.2–4.8)
LYMPHOCYTES NFR BLD AUTO: 35.8 %
MCH RBC QN AUTO: 31.3 PG (ref 26–34)
MCHC RBC AUTO-ENTMCNC: 33 G/DL (ref 32–36)
MCV RBC AUTO: 95 FL (ref 80–100)
MONOCYTES # BLD AUTO: 0.35 X10*3/UL (ref 0.1–1)
MONOCYTES NFR BLD AUTO: 8 %
NEUTROPHILS # BLD AUTO: 1.89 X10*3/UL (ref 1.2–7.7)
NEUTROPHILS NFR BLD AUTO: 43 %
NON HDL CHOLESTEROL: 96 MG/DL (ref 0–149)
NRBC BLD-RTO: 0 /100 WBCS (ref 0–0)
PLATELET # BLD AUTO: 303 X10*3/UL (ref 150–450)
POTASSIUM SERPL-SCNC: 4.4 MMOL/L (ref 3.5–5.3)
PROT SERPL-MCNC: 7.2 G/DL (ref 6.4–8.2)
RBC # BLD AUTO: 4.51 X10*6/UL (ref 4–5.2)
SODIUM SERPL-SCNC: 139 MMOL/L (ref 136–145)
T4 FREE SERPL-MCNC: 1.61 NG/DL (ref 0.78–1.48)
TRIGL SERPL-MCNC: 100 MG/DL (ref 0–149)
TSH SERPL-ACNC: 0.41 MIU/L (ref 0.44–3.98)
VLDL: 20 MG/DL (ref 0–40)
WBC # BLD AUTO: 4.4 X10*3/UL (ref 4.4–11.3)

## 2024-08-25 RX ORDER — LEVOTHYROXINE SODIUM 75 UG/1
75 TABLET ORAL
Qty: 90 TABLET | Refills: 0 | Status: SHIPPED | OUTPATIENT
Start: 2024-08-25

## 2024-08-27 ENCOUNTER — TELEPHONE (OUTPATIENT)
Dept: PRIMARY CARE | Facility: CLINIC | Age: 64
End: 2024-08-27
Payer: COMMERCIAL

## 2024-08-27 NOTE — TELEPHONE ENCOUNTER
----- Message from Petra Yoon sent at 8/25/2024  3:21 PM EDT -----  Report to patient that her TSH for thyroid is abnormal, she is on too much medication, she should go down to taking 1/day and stop with the 2 extra half tablets she is taking each week.  Her blood count is normal.  Her chemistries are all normal.  And her cholesterol profile is in good range.  No change in other medications.

## 2024-08-31 ENCOUNTER — HOSPITAL ENCOUNTER (OUTPATIENT)
Dept: RADIOLOGY | Facility: CLINIC | Age: 64
Discharge: HOME | End: 2024-08-31
Payer: COMMERCIAL

## 2024-08-31 DIAGNOSIS — E78.5 HYPERLIPIDEMIA, UNSPECIFIED HYPERLIPIDEMIA TYPE: ICD-10-CM

## 2024-08-31 PROCEDURE — 75571 CT HRT W/O DYE W/CA TEST: CPT

## 2024-09-03 ENCOUNTER — TELEPHONE (OUTPATIENT)
Dept: PRIMARY CARE | Facility: CLINIC | Age: 64
End: 2024-09-03
Payer: COMMERCIAL

## 2024-09-03 NOTE — TELEPHONE ENCOUNTER
Spoke to patient she is aware of her ct calcium scoring test she is concerned with the impression stating a small hiatal hernia and she has been having left side discomfort in the abdomin please advise.

## 2024-09-03 NOTE — TELEPHONE ENCOUNTER
----- Message from Petra Yoon sent at 9/2/2024  6:56 PM EDT -----  Report to pt her coronary calcium score is zero

## 2024-09-05 NOTE — TELEPHONE ENCOUNTER
Left voicemail for aptient with the answer to her questions asked to call office to schedule an apt.

## 2024-09-13 ENCOUNTER — APPOINTMENT (OUTPATIENT)
Dept: PRIMARY CARE | Facility: CLINIC | Age: 64
End: 2024-09-13
Payer: COMMERCIAL

## 2024-09-13 VITALS
WEIGHT: 191 LBS | HEIGHT: 63 IN | RESPIRATION RATE: 18 BRPM | HEART RATE: 75 BPM | BODY MASS INDEX: 33.84 KG/M2 | TEMPERATURE: 97.5 F | DIASTOLIC BLOOD PRESSURE: 80 MMHG | SYSTOLIC BLOOD PRESSURE: 120 MMHG | OXYGEN SATURATION: 96 %

## 2024-09-13 DIAGNOSIS — E78.5 HYPERLIPIDEMIA, UNSPECIFIED HYPERLIPIDEMIA TYPE: ICD-10-CM

## 2024-09-13 DIAGNOSIS — R11.2 NAUSEA AND VOMITING, UNSPECIFIED VOMITING TYPE: ICD-10-CM

## 2024-09-13 DIAGNOSIS — R13.10 DYSPHAGIA, UNSPECIFIED TYPE: Primary | ICD-10-CM

## 2024-09-13 PROCEDURE — 99214 OFFICE O/P EST MOD 30 MIN: CPT | Performed by: FAMILY MEDICINE

## 2024-09-13 PROCEDURE — 1036F TOBACCO NON-USER: CPT | Performed by: FAMILY MEDICINE

## 2024-09-13 PROCEDURE — 3008F BODY MASS INDEX DOCD: CPT | Performed by: FAMILY MEDICINE

## 2024-09-13 ASSESSMENT — PAIN SCALES - GENERAL: PAINLEVEL: 0-NO PAIN

## 2024-09-13 NOTE — PROGRESS NOTES
"Subjective   Patient ID: Deena Bello is a 63 y.o. female who presents for F/UP HERNIA.    HPI   The patient presents to the clinic for hiatal hernia follow-up. She has past medical history of HDL, chronic sinusitis, hypothyroidism, Vitamin D deficiency, cervical dysplasia, depression with anxiety, left shoulder pain, scapular dyskinesis, lumbar radiculitis, lumbar spondylosis, asthma, chronic insomnia, chronic fatigue, and SALMA.     The patient had a CT cardiac scoring study on 08/31/2024. Her coronary artery score was 0 (low-risk for future coronary events) at the time. However, the patient is slightly concerned as a small hiatal hernia was incidentally observed during this study. The patient does not believe that she is suffering from any major symptoms associated with this hiatal hernia. She denies gerd and heartburn type symptoms.  However, with further questioning she does state  that she often feels nauseous (as if she was about to vomit) when she eats slightly fast. She describes this sensation as a spasm (\"almost like if food gets stuck\"). She states that she sometimes has to stop eating senior care through her meal secondary to nausea and then throws up and then able to go back and finish her meal.after 10 minutes or so. She states that she experiences these nausea symptoms with vomiting and spasms on average of  ~2-3 times per month. She denies any esophageal reflux symptoms. She has not had an endoscopy previously.  She is unsure how long it has been going on but believes at least 1 year and maybe 2    Her most recent colonoscopy screening was done in January 2017 (10-year recall). Her most recent mammogram screening was done in March 2024. Her most recent gynecological exam (with Dr. Benz) was done in March 2024.    Review of Systems    Objective   /80   Pulse 75   Temp 36.4 °C (97.5 °F)   Resp 18   Ht 1.6 m (5' 3\")   Wt 86.6 kg (191 lb)   SpO2 96%   BMI 33.83 kg/m²     Physical " Exam  Constitutional:       Appearance: Normal appearance.   Neck:      Vascular: No carotid bruit.   Cardiovascular:      Rate and Rhythm: Normal rate and regular rhythm.      Pulses: Normal pulses.      Heart sounds: Normal heart sounds.   Pulmonary:      Effort: Pulmonary effort is normal.      Breath sounds: Normal breath sounds.   Abdominal:      General: Bowel sounds are normal.      Palpations: Abdomen is soft. There is no mass.      Tenderness: There is no abdominal tenderness.   Musculoskeletal:         General: Normal range of motion.      Cervical back: Normal range of motion.      Right lower leg: No edema.      Left lower leg: No edema.   Skin:     General: Skin is warm and dry.   Neurological:      Mental Status: She is alert and oriented to person, place, and time.   Psychiatric:         Mood and Affect: Mood normal.         Thought Content: Thought content normal.         Judgment: Judgment normal.         Assessment/Plan   Problem List Items Addressed This Visit             ICD-10-CM    Hyperlipidemia E78.5     Other Visit Diagnoses         Codes    Dysphagia, unspecified type    -  Primary R13.10    Relevant Orders    Referral to Gastroenterology    Nausea and vomiting, unspecified vomiting type     R11.2               In regards to concerns with nausea/vomiting/dysphagia-like sensation when eating slightly fast, the patient received a referral to gastroenterology for further evaluation of this condition. Discussed ordering egd to look for stricture and to investigate HH seen on CT but pt would like other way to work this up if possible.  She was advised to start monitoring if she experiences these symptoms secondary to certain types of foods, keep diary of symptoms to help w evaluation w GI. She was instructed to continue monitoring symptoms for improvement/exacerbation.    Reviewed coronary calcium score in detail w pt    8/24/24 had labs reviewed w pt today also, repeat 6 month    No med refill  needed today    She will follow-up in November 2024, will need zolpidem at that time, unless otherwise needed.    Scribe Attestation  By signing my name below, I, Nazia Ortiz   attest that this documentation has been prepared under the direction and in the presence of Petra Yoon DO.

## 2024-10-15 DIAGNOSIS — E03.9 HYPOTHYROIDISM, UNSPECIFIED TYPE: ICD-10-CM

## 2024-11-15 ENCOUNTER — APPOINTMENT (OUTPATIENT)
Dept: PRIMARY CARE | Facility: CLINIC | Age: 64
End: 2024-11-15
Payer: COMMERCIAL

## 2024-11-15 VITALS
WEIGHT: 189 LBS | HEART RATE: 78 BPM | SYSTOLIC BLOOD PRESSURE: 122 MMHG | BODY MASS INDEX: 33.49 KG/M2 | HEIGHT: 63 IN | OXYGEN SATURATION: 96 % | DIASTOLIC BLOOD PRESSURE: 62 MMHG | TEMPERATURE: 98.2 F

## 2024-11-15 DIAGNOSIS — E03.9 HYPOTHYROIDISM, UNSPECIFIED TYPE: ICD-10-CM

## 2024-11-15 DIAGNOSIS — Z78.0 POSTMENOPAUSAL: ICD-10-CM

## 2024-11-15 DIAGNOSIS — G47.33 OSA (OBSTRUCTIVE SLEEP APNEA): ICD-10-CM

## 2024-11-15 DIAGNOSIS — F51.01 PRIMARY INSOMNIA: Primary | ICD-10-CM

## 2024-11-15 DIAGNOSIS — E78.5 HYPERLIPIDEMIA, UNSPECIFIED HYPERLIPIDEMIA TYPE: ICD-10-CM

## 2024-11-15 PROBLEM — E66.3 PEDIATRIC OVERWEIGHT: Status: ACTIVE | Noted: 2024-11-15

## 2024-11-15 PROCEDURE — 99214 OFFICE O/P EST MOD 30 MIN: CPT | Performed by: FAMILY MEDICINE

## 2024-11-15 PROCEDURE — 1036F TOBACCO NON-USER: CPT | Performed by: FAMILY MEDICINE

## 2024-11-15 PROCEDURE — 3008F BODY MASS INDEX DOCD: CPT | Performed by: FAMILY MEDICINE

## 2024-11-15 RX ORDER — ZOLPIDEM TARTRATE 5 MG/1
5 TABLET ORAL NIGHTLY PRN
Qty: 30 TABLET | Refills: 2 | Status: SHIPPED | OUTPATIENT
Start: 2024-11-15 | End: 2025-02-13

## 2024-11-15 RX ORDER — LEVOTHYROXINE SODIUM 75 UG/1
75 TABLET ORAL
Qty: 90 TABLET | Refills: 0 | Status: SHIPPED | OUTPATIENT
Start: 2024-11-15

## 2024-11-15 RX ORDER — LEVOTHYROXINE SODIUM 75 UG/1
TABLET ORAL
Qty: 38 TABLET | Refills: 2 | OUTPATIENT
Start: 2024-11-15

## 2024-11-15 NOTE — PROGRESS NOTES
Subjective   Patient ID: Deena Bello is a 64 y.o. female who presents for 3 MONTH FUV.    HPI   The patient presents to the clinic for a 3-month follow-up. She has past medical history of HDL, chronic sinusitis, hypothyroidism, vitamin D deficiency, cervical dysplasia, depression with anxiety, left shoulder pain, scapular dyskinesis, lumbar radiculitis, lumbar spondylosis, asthma, chronic insomnia, chronic fatigue, and SALMA.     She continues on Ambien CR 6.25 mg nightly for treatment of insomnia. She has been sleeping somewhat well on Ambien CR medication (some issues falling asleep and staying asleep). She denies any side-effects to her Ambien CR medication. She reports that she has been taking a magnesium supplement as an additional sleep aid as insomnia symptoms have not been completed controlled by Ambien CR medication. She inquires about discontinuing the long-acting form of Ambien medication (Ambien CR) and re-starting the short-acting form of Ambien medication. She feels that her insomnia was better controlled on the short-acting form of Ambien medication. She also reports that she has very mild untreated obstructive sleep apnea as she has not been able to find a comfortable CPAP mask for her machine for herself.    She is on rosuvastatin 20 mg daily for treatment of hyperlipidemia. She is tolerating this dose of rosuvastatin medication well. She denies any side-effects to her rosuvastatin medication.  She is due for labs in 3 months    She takes levothyroxine 75 mcg daily for treatment of hypothyroidism. She has been responding well to this dose of levothyroxine medication. She denies any side-effects to her levothyroxine medication.  Will check tsh with her next labs    Her most recent gynecological exam (with OB-GYN Dr. Benz) and mammogram screening were done in March 2024. Her most recent colonoscopy screening was done in January 2017 (10-year recall).    Review of Systems    Objective   /62   " Pulse 78   Temp 36.8 °C (98.2 °F)   Ht 1.588 m (5' 2.5\")   Wt 85.7 kg (189 lb)   SpO2 96%   BMI 34.02 kg/m²     Physical Exam  Neck:      Vascular: No carotid bruit.   Cardiovascular:      Rate and Rhythm: Normal rate and regular rhythm.      Pulses: Normal pulses.      Heart sounds: Normal heart sounds.   Pulmonary:      Effort: Pulmonary effort is normal.      Breath sounds: Normal breath sounds.   Musculoskeletal:         General: Normal range of motion.      Cervical back: Normal range of motion.      Right lower leg: No edema.      Left lower leg: No edema.   Skin:     General: Skin is warm and dry.   Neurological:      General: No focal deficit present.      Mental Status: She is alert and oriented to person, place, and time.   Psychiatric:         Mood and Affect: Mood normal.         Thought Content: Thought content normal.         Judgment: Judgment normal.         Assessment/Plan   Problem List Items Addressed This Visit             ICD-10-CM    Hyperlipidemia E78.5    Relevant Orders    Comprehensive Metabolic Panel    Lipid Panel    Insomnia - Primary G47.00    Relevant Medications    zolpidem (Ambien) 5 mg tablet    SALMA (obstructive sleep apnea) G47.33    Hypothyroidism E03.9    Relevant Medications    levothyroxine (Synthroid, Levoxyl) 75 mcg tablet    Other Relevant Orders    TSH with reflex to Free T4 if abnormal     Other Visit Diagnoses         Codes    Postmenopausal     Z78.0    Relevant Orders    XR DEXA bone density               Repeat labs (CMP, lipid panel, TSH) were ordered for the patient to complete in 3 months, unless otherwise needed.  Will review at [facility]    A DEXA bone density scan was ordered for the patient. She intends to complete this screening order soon. The clinic will contact the patient upon receiving her screening results.    In regards to concerns with insomnia, the patient was instructed to discontinue use of Ambien CR medication (long-acting). Alternatively, she " received a prescription for Ambien 5 mg medication (short-acting) for treatment of insomnia in accordance with her request. She was instructed to take 1 tablet (5 mg) of Ambien by mouth PRN before bed. OARRS were reviewed. Risk to benefit ratio was assessed. No suspicious activity was observed. Her most recent UDS/CSA was done on 02/15/2024. She was instructed to continue monitoring for potential side-effects to Ambien medication. Continue monitoring symptoms for improvement/exacerbation.    In regards to her hypothyroidism, the patient received a refill for her levothyroxine 75 mcg medication. She was instructed to continue taking her levothyroxine medication as previously directed. Continue monitoring symptoms for improvement/exacerbation.     She will follow-up in 3 months, unless otherwise needed.    Scribe Attestation  By signing my name below, IMarah Scribe   attest that this documentation has been prepared under the direction and in the presence of Petra Yoon DO.

## 2024-11-18 ENCOUNTER — APPOINTMENT (OUTPATIENT)
Dept: RADIOLOGY | Facility: CLINIC | Age: 64
End: 2024-11-18
Payer: COMMERCIAL

## 2024-11-20 ENCOUNTER — TELEPHONE (OUTPATIENT)
Dept: PRIMARY CARE | Facility: CLINIC | Age: 64
End: 2024-11-20
Payer: COMMERCIAL

## 2024-11-20 ENCOUNTER — HOSPITAL ENCOUNTER (OUTPATIENT)
Dept: RADIOLOGY | Facility: CLINIC | Age: 64
Discharge: HOME | End: 2024-11-20
Payer: COMMERCIAL

## 2024-11-20 DIAGNOSIS — Z78.0 POSTMENOPAUSAL: ICD-10-CM

## 2024-11-20 PROCEDURE — 77080 DXA BONE DENSITY AXIAL: CPT | Performed by: RADIOLOGY

## 2024-11-20 PROCEDURE — 77080 DXA BONE DENSITY AXIAL: CPT

## 2024-11-20 NOTE — TELEPHONE ENCOUNTER
----- Message from Petra Yoon sent at 11/20/2024 12:58 PM EST -----  Report to pt her bone density is normal, repeat in 2 years

## 2025-02-07 LAB
ALBUMIN SERPL-MCNC: 4.5 G/DL (ref 3.6–5.1)
ALP SERPL-CCNC: 70 U/L (ref 37–153)
ALT SERPL-CCNC: 18 U/L (ref 6–29)
ANION GAP SERPL CALCULATED.4IONS-SCNC: 9 MMOL/L (CALC) (ref 7–17)
AST SERPL-CCNC: 19 U/L (ref 10–35)
BILIRUB SERPL-MCNC: 0.5 MG/DL (ref 0.2–1.2)
BUN SERPL-MCNC: 13 MG/DL (ref 7–25)
CALCIUM SERPL-MCNC: 9.6 MG/DL (ref 8.6–10.4)
CHLORIDE SERPL-SCNC: 102 MMOL/L (ref 98–110)
CHOLEST SERPL-MCNC: 165 MG/DL
CHOLEST/HDLC SERPL: 2.4 (CALC)
CO2 SERPL-SCNC: 28 MMOL/L (ref 20–32)
CREAT SERPL-MCNC: 0.99 MG/DL (ref 0.5–1.05)
EGFRCR SERPLBLD CKD-EPI 2021: 64 ML/MIN/1.73M2
GLUCOSE SERPL-MCNC: 92 MG/DL (ref 65–99)
HDLC SERPL-MCNC: 69 MG/DL
LDLC SERPL CALC-MCNC: 80 MG/DL (CALC)
NONHDLC SERPL-MCNC: 96 MG/DL (CALC)
POTASSIUM SERPL-SCNC: 4.3 MMOL/L (ref 3.5–5.3)
PROT SERPL-MCNC: 7.1 G/DL (ref 6.1–8.1)
SODIUM SERPL-SCNC: 139 MMOL/L (ref 135–146)
TRIGL SERPL-MCNC: 82 MG/DL
TSH SERPL-ACNC: 1.77 MIU/L (ref 0.4–4.5)

## 2025-02-11 ENCOUNTER — APPOINTMENT (OUTPATIENT)
Dept: PRIMARY CARE | Facility: CLINIC | Age: 65
End: 2025-02-11
Payer: COMMERCIAL

## 2025-02-11 VITALS
OXYGEN SATURATION: 97 % | WEIGHT: 188 LBS | HEIGHT: 63 IN | RESPIRATION RATE: 12 BRPM | DIASTOLIC BLOOD PRESSURE: 76 MMHG | HEART RATE: 80 BPM | SYSTOLIC BLOOD PRESSURE: 112 MMHG | BODY MASS INDEX: 33.31 KG/M2

## 2025-02-11 DIAGNOSIS — J45.20 MILD INTERMITTENT ASTHMA WITHOUT COMPLICATION (HHS-HCC): ICD-10-CM

## 2025-02-11 DIAGNOSIS — F51.01 PRIMARY INSOMNIA: ICD-10-CM

## 2025-02-11 DIAGNOSIS — F51.04 CHRONIC INSOMNIA: ICD-10-CM

## 2025-02-11 DIAGNOSIS — E78.00 PURE HYPERCHOLESTEROLEMIA: Primary | ICD-10-CM

## 2025-02-11 DIAGNOSIS — E66.09 CLASS 1 OBESITY DUE TO EXCESS CALORIES WITHOUT SERIOUS COMORBIDITY WITH BODY MASS INDEX (BMI) OF 33.0 TO 33.9 IN ADULT: ICD-10-CM

## 2025-02-11 DIAGNOSIS — E03.9 HYPOTHYROIDISM, UNSPECIFIED TYPE: ICD-10-CM

## 2025-02-11 DIAGNOSIS — G47.33 OSA (OBSTRUCTIVE SLEEP APNEA): ICD-10-CM

## 2025-02-11 DIAGNOSIS — F41.8 DEPRESSION WITH ANXIETY: ICD-10-CM

## 2025-02-11 DIAGNOSIS — E66.811 CLASS 1 OBESITY DUE TO EXCESS CALORIES WITHOUT SERIOUS COMORBIDITY WITH BODY MASS INDEX (BMI) OF 33.0 TO 33.9 IN ADULT: ICD-10-CM

## 2025-02-11 DIAGNOSIS — E78.5 HYPERLIPIDEMIA, UNSPECIFIED: ICD-10-CM

## 2025-02-11 PROCEDURE — 99214 OFFICE O/P EST MOD 30 MIN: CPT | Performed by: FAMILY MEDICINE

## 2025-02-11 PROCEDURE — 3008F BODY MASS INDEX DOCD: CPT | Performed by: FAMILY MEDICINE

## 2025-02-11 PROCEDURE — 1036F TOBACCO NON-USER: CPT | Performed by: FAMILY MEDICINE

## 2025-02-11 RX ORDER — ROSUVASTATIN CALCIUM 20 MG/1
20 TABLET, COATED ORAL DAILY
Qty: 90 TABLET | Refills: 1 | Status: SHIPPED | OUTPATIENT
Start: 2025-02-11

## 2025-02-11 RX ORDER — ZOLPIDEM TARTRATE 5 MG/1
5 TABLET ORAL NIGHTLY PRN
Qty: 30 TABLET | Refills: 2 | Status: SHIPPED | OUTPATIENT
Start: 2025-02-14 | End: 2025-05-15

## 2025-02-11 RX ORDER — BUPROPION HYDROCHLORIDE 150 MG/1
150 TABLET ORAL
Qty: 90 TABLET | Refills: 1 | Status: SHIPPED | OUTPATIENT
Start: 2025-02-11

## 2025-02-11 RX ORDER — LEVOTHYROXINE SODIUM 75 UG/1
75 TABLET ORAL
Qty: 90 TABLET | Refills: 0 | Status: SHIPPED | OUTPATIENT
Start: 2025-02-11

## 2025-02-11 ASSESSMENT — ENCOUNTER SYMPTOMS
ADENOPATHY: 0
NAUSEA: 0
SINUS PRESSURE: 0
DIARRHEA: 0
DIFFICULTY URINATING: 0
DYSPHORIC MOOD: 1
APPETITE CHANGE: 0
ABDOMINAL DISTENTION: 0
SLEEP DISTURBANCE: 1
BRUISES/BLEEDS EASILY: 0
CHILLS: 0
WHEEZING: 0
HEADACHES: 0
ABDOMINAL PAIN: 0
FATIGUE: 0
FEVER: 0
LIGHT-HEADEDNESS: 0
SHORTNESS OF BREATH: 0
DYSURIA: 0
MYALGIAS: 0
CHEST TIGHTNESS: 0
ARTHRALGIAS: 0
NERVOUS/ANXIOUS: 1

## 2025-02-11 NOTE — PROGRESS NOTES
"Subjective   Patient ID: Deena Bello is a 64 y.o. female who presents for Follow-up.    HPI     Review of Systems    Objective   /76   Pulse 80   Resp 12   Ht 1.588 m (5' 2.5\")   Wt 85.3 kg (188 lb)   SpO2 97%   BMI 33.84 kg/m²     Physical Exam    Assessment/Plan          "

## 2025-02-11 NOTE — PROGRESS NOTES
Subjective   Patient ID: Deena Bello is a 64 y.o. female who presents for Follow-up.  Pt has Dyslipidemia.   Lipid panel showed LDL in good range.  Currently taking Crestor and is tolerating well without muscle pains or weakness.     Pt has chronic, stable hypothyroidism.   Taking Synthroid  TSH is in good range.  Lipid panel is well controlled.  Energy is fair. Pt denies significant weight gain, low mood, constipation, or skin changes.   Medication is tolerated well without anxiety, tremors, palpitations, involuntary weight loss, heat intolerance or diarrhea.     Pt has chronic and stable depression / anxiety.  Pt has supportive family/friends.   Pt is not seeing a counselor.   Taking Bupropion and it is helping.   Mood, energy, motivation, interests, sleep and appetite are adequate. Anxiety is stable.  Pt denies suicidal ideations.     Pt has chronic asthma. Taking Albuterol . Needs to use rescue albuterol inhaler less than weekly. Not having night time symptoms.  Not having symptoms of SOB and cough heightened from baseline.     For insomnia/SALMA pt is taking Ambien.  Tolerating well. Intolerant to CPAP. Getting 5 hours of sleep. Not having trouble getting to sleep or staying asleep. Daytime energy is good.     Pt is taking Ambie as prescribed.   Pt is not taking both opioid and benzodiazepine.   OARRS report checked today reviewed and is appropriate.  UDS  checked today  Controlled substance contracted was printed, signed and provided to the patient today.  It is my opinion that this patient is benefiting from the prescribed controlled medication.         Review of Systems   Constitutional:  Negative for appetite change, chills, fatigue and fever.   HENT:  Negative for congestion, ear pain and sinus pressure.    Eyes:  Negative for visual disturbance.   Respiratory:  Negative for chest tightness, shortness of breath and wheezing.    Cardiovascular:  Negative for chest pain.   Gastrointestinal:  Negative for  "abdominal distention, abdominal pain, diarrhea and nausea.   Genitourinary:  Negative for difficulty urinating, dysuria and pelvic pain.   Musculoskeletal:  Negative for arthralgias and myalgias.   Skin:  Negative for rash.   Allergic/Immunologic: Negative for immunocompromised state.   Neurological:  Negative for light-headedness and headaches.   Hematological:  Negative for adenopathy. Does not bruise/bleed easily.   Psychiatric/Behavioral:  Positive for dysphoric mood and sleep disturbance. The patient is nervous/anxious.        Objective   /76   Pulse 80   Resp 12   Ht 1.588 m (5' 2.5\")   Wt 85.3 kg (188 lb)   SpO2 97%   BMI 33.84 kg/m²    Physical Exam  Constitutional:       General: She is not in acute distress.     Appearance: Normal appearance.   Cardiovascular:      Rate and Rhythm: Normal rate and regular rhythm.      Heart sounds: Normal heart sounds. No murmur heard.  Pulmonary:      Effort: Pulmonary effort is normal.      Breath sounds: Normal breath sounds.   Abdominal:      Palpations: Abdomen is soft.      Tenderness: There is no abdominal tenderness.   Neurological:      Mental Status: She is alert.   Psychiatric:         Mood and Affect: Mood normal.         Judgment: Judgment normal.           Assessment/Plan   Diagnoses and all orders for this visit:  Pure hypercholesterolemia - well controlled with Rosuvastatin, continue current dose and monitor  Hypothyroidism - stable on Synthroid, continue current dose and monitor  Mild intermittent asthma  - very well controlled on Albuterol as needed  Chronic insomnia - stable with Ambien, continue at current dose for now  SALMA (obstructive sleep apnea) - untreated, briefly disussed treatment with CPAP or oral appliance  Depression with anxiety - well controlled with Bupropion, continue current dose   Weight - recommend low carb diet, increasing water intake to at least 64oz/day, healthy snacking between meals, and regular cardiovascular " exercise 150mins/week. Goal for weight loss is 1-2# per week.     Follow up in 3 months with Dr Fry

## 2025-02-13 ENCOUNTER — APPOINTMENT (OUTPATIENT)
Dept: PRIMARY CARE | Facility: CLINIC | Age: 65
End: 2025-02-13
Payer: COMMERCIAL

## 2025-02-13 LAB
AMPHETAMINES UR QL: NEGATIVE NG/ML
BARBITURATES UR QL: NEGATIVE NG/ML
BENZODIAZ UR QL: NEGATIVE NG/ML
BZE UR QL: NEGATIVE NG/ML
CREAT UR-MCNC: 172.8 MG/DL
DRUG SCREEN COMMENT UR-IMP: ABNORMAL
METHADONE UR QL: NEGATIVE NG/ML
OPIATES UR QL: NEGATIVE NG/ML
OXIDANTS UR QL: NEGATIVE MCG/ML
OXYCODONE UR QL: NEGATIVE NG/ML
PCP UR QL: NEGATIVE NG/ML
PH UR: 6.7 [PH] (ref 4.5–9)
QUEST NOTES AND COMMENTS: ABNORMAL
QUEST ZOLPIDEM: 14 NG/ML
THC UR QL: NEGATIVE NG/ML
ZOLPIDEM PHENYL-4-CARB UR CFM-MCNC: >2500 NG/ML

## 2025-05-15 ENCOUNTER — APPOINTMENT (OUTPATIENT)
Dept: PRIMARY CARE | Facility: CLINIC | Age: 65
End: 2025-05-15
Payer: COMMERCIAL

## 2025-05-15 VITALS
TEMPERATURE: 98 F | OXYGEN SATURATION: 95 % | BODY MASS INDEX: 34.56 KG/M2 | SYSTOLIC BLOOD PRESSURE: 119 MMHG | HEART RATE: 70 BPM | WEIGHT: 192 LBS | DIASTOLIC BLOOD PRESSURE: 76 MMHG

## 2025-05-15 DIAGNOSIS — F51.04 CHRONIC INSOMNIA: ICD-10-CM

## 2025-05-15 DIAGNOSIS — J45.20 MILD INTERMITTENT ASTHMA WITHOUT COMPLICATION (HHS-HCC): ICD-10-CM

## 2025-05-15 DIAGNOSIS — Z12.31 ENCOUNTER FOR SCREENING MAMMOGRAM FOR MALIGNANT NEOPLASM OF BREAST: ICD-10-CM

## 2025-05-15 DIAGNOSIS — R53.82 CHRONIC FATIGUE: ICD-10-CM

## 2025-05-15 DIAGNOSIS — E78.00 PURE HYPERCHOLESTEROLEMIA: Primary | ICD-10-CM

## 2025-05-15 PROCEDURE — 99214 OFFICE O/P EST MOD 30 MIN: CPT | Performed by: FAMILY MEDICINE

## 2025-05-15 PROCEDURE — 1036F TOBACCO NON-USER: CPT | Performed by: FAMILY MEDICINE

## 2025-05-15 RX ORDER — FLUTICASONE FUROATE AND VILANTEROL 200; 25 UG/1; UG/1
1 POWDER RESPIRATORY (INHALATION) DAILY
Qty: 1 EACH | Refills: 3 | Status: SHIPPED | OUTPATIENT
Start: 2025-05-15

## 2025-05-15 RX ORDER — ZOLPIDEM TARTRATE 6.25 MG/1
6.25 TABLET, FILM COATED, EXTENDED RELEASE ORAL NIGHTLY PRN
Qty: 30 TABLET | Refills: 2 | Status: SHIPPED | OUTPATIENT
Start: 2025-05-15

## 2025-05-15 ASSESSMENT — PAIN SCALES - GENERAL: PAINLEVEL_OUTOF10: 0-NO PAIN

## 2025-05-15 NOTE — PROGRESS NOTES
Subjective   Patient ID: Deena Bello is a 64 y.o. female who presents for Follow-up (3 MONTH FOLLOW UP ) and Med Refill.    HPI   Patient is in the office today for a follow-up.    She would like to discuss her lab results from 02/11/2025.     She is on rosuvastatin 20 mg daily for treatment of hyperlipidemia. She is tolerating this dose of rosuvastatin medication well. She denies any side-effects to her rosuvastatin medication. Her last lipid panel from 02/11/2025 showed: , HDL 69, Triglycerides 82, LDL 80.    She takes levothyroxine 75 mcg daily for treatment of hypothyroidism. She has been responding well to this dose of levothyroxine medication. She denies any side-effects to her levothyroxine medication. Her last TSH from 02/11/2025 showed: TSH 1.77.    She continues on Ambien 5 mg nightly for treatment of insomnia. She has been sleeping somewhat well on Ambien CR medication previously (some issues falling asleep and staying asleep now). She denies any side-effects to her Ambien medication. She reports that she has been taking a magnesium supplement as an additional sleep aid as insomnia symptoms have not been completed controlled by Ambien  medication. She would like to go back to Ambien CR. Her last drug screen test was in February 2025.    Pt stopped her advair months ago, felt did not need for asthma, she has started having some mild symptoms this spring and wants to go back on a similar inhaler    Review of Systems    Objective   /76 (BP Location: Left arm, Patient Position: Sitting, BP Cuff Size: Adult)   Pulse 70   Temp 36.7 °C (98 °F) (Temporal)   Wt 87.1 kg (192 lb)   SpO2 95%   BMI 34.56 kg/m²     Physical Exam  Constitutional:       Appearance: Normal appearance.   Neck:      Vascular: No carotid bruit.   Cardiovascular:      Rate and Rhythm: Normal rate and regular rhythm.      Pulses: Normal pulses.      Heart sounds: Normal heart sounds.   Pulmonary:      Effort: Pulmonary  effort is normal.      Breath sounds: Normal breath sounds.   Musculoskeletal:         General: Normal range of motion.      Cervical back: Normal range of motion.      Right lower leg: No edema.      Left lower leg: No edema.   Skin:     General: Skin is warm and dry.   Neurological:      Mental Status: She is alert and oriented to person, place, and time.   Psychiatric:         Mood and Affect: Mood normal.         Behavior: Behavior normal.         Thought Content: Thought content normal.         Judgment: Judgment normal.         Assessment/Plan   Diagnoses and all orders for this visit:  Pure hypercholesterolemia  -     Comprehensive Metabolic Panel; Future  -     Lipid Panel; Future  Chronic insomnia  -     zolpidem CR (Ambien CR) 6.25 mg ER tablet; Take 1 tablet (6.25 mg) by mouth as needed at bedtime for sleep. Do not crush, chew, or split.  Chronic fatigue  -     CBC and Auto Differential; Future  Mild intermittent asthma without complication ([facility]-H[facility])  -     fluticasone furoate-vilanteroL (Breo Ellipta) 200-25 mcg/dose inhaler; Inhale 1 puff once daily.  Encounter for screening mammogram for malignant neoplasm of breast  -     BI mammo bilateral screening tomosynthesis; Future    Discussed lab results from 02/11/2025 which showed: , HDL 69, Triglycerides 82, LDL 80, glucose 92, creatinine 0.99, GFR 64, TSH 1.77  Recommended lab tests for follow-up in 3 months.  Recommended screening mammogram.  Medications refilled today. Instructed take medications as prescribed. CSA reviewed and signed today.  Started Breo ellipta daily, se profile and usage discussed  Drug screen test performed last February 2025.  Follow-up in 3 months, call us if needed sooner.    Scribe Attestation  By signing my name below, INestor Scribe   attest that this documentation has been prepared under the direction and in the presence of Petra Yoon DO.

## 2025-05-30 ENCOUNTER — TELEPHONE (OUTPATIENT)
Dept: PRIMARY CARE | Facility: CLINIC | Age: 65
End: 2025-05-30
Payer: COMMERCIAL

## 2025-05-30 DIAGNOSIS — J45.20 MILD INTERMITTENT ASTHMA WITHOUT COMPLICATION (HHS-HCC): Primary | ICD-10-CM

## 2025-05-30 RX ORDER — FLUTICASONE PROPIONATE AND SALMETEROL 250; 50 UG/1; UG/1
1 POWDER RESPIRATORY (INHALATION)
Qty: 60 EACH | Refills: 3 | Status: SHIPPED | OUTPATIENT
Start: 2025-05-30

## 2025-05-30 NOTE — TELEPHONE ENCOUNTER
The patient called and said that you prescribed an inhaler  (BREO)  for her but she doesn't feel comfortable, can you prescribe another one for her?  Please advise ..  Thanks.

## 2025-06-07 DIAGNOSIS — E03.9 HYPOTHYROIDISM, UNSPECIFIED TYPE: ICD-10-CM

## 2025-08-04 RX ORDER — LEVOTHYROXINE SODIUM 75 UG/1
TABLET ORAL
Qty: 103 TABLET | Refills: 0 | Status: SHIPPED | OUTPATIENT
Start: 2025-08-04 | End: 2025-08-04 | Stop reason: SDUPTHER

## 2025-08-04 RX ORDER — LEVOTHYROXINE SODIUM 75 UG/1
TABLET ORAL
Qty: 103 TABLET | Refills: 0 | Status: SHIPPED | OUTPATIENT
Start: 2025-08-04

## 2025-08-04 NOTE — TELEPHONE ENCOUNTER
Prescription Request    PCP: Petra Yoon, DO    Rx refill request from [facility] pharmacy for the following:   - levothyroxine 75mcg daily and extra 0.5 tab twice weekly  Approved: 90 days supply until follow-up to ensure pt is on appropriate qty and day supply    Follow-up Visit Date: 8/13/25  Last seen: office visit: 5/15/25 with PCP - RTC 3m    Monitoring Parameters  2/6/25: (TSH 1.77)

## 2025-08-07 DIAGNOSIS — R53.82 CHRONIC FATIGUE: ICD-10-CM

## 2025-08-07 DIAGNOSIS — E78.00 PURE HYPERCHOLESTEROLEMIA: ICD-10-CM

## 2025-08-09 LAB
ALBUMIN SERPL-MCNC: 4.4 G/DL (ref 3.6–5.1)
ALP SERPL-CCNC: 66 U/L (ref 37–153)
ALT SERPL-CCNC: 12 U/L (ref 6–29)
ANION GAP SERPL CALCULATED.4IONS-SCNC: 9 MMOL/L (CALC) (ref 7–17)
AST SERPL-CCNC: 14 U/L (ref 10–35)
BASOPHILS # BLD AUTO: 59 CELLS/UL (ref 0–200)
BASOPHILS NFR BLD AUTO: 0.7 %
BILIRUB SERPL-MCNC: 0.4 MG/DL (ref 0.2–1.2)
BUN SERPL-MCNC: 10 MG/DL (ref 7–25)
CALCIUM SERPL-MCNC: 9.6 MG/DL (ref 8.6–10.4)
CHLORIDE SERPL-SCNC: 103 MMOL/L (ref 98–110)
CHOLEST SERPL-MCNC: 141 MG/DL
CHOLEST/HDLC SERPL: 2.3 (CALC)
CO2 SERPL-SCNC: 26 MMOL/L (ref 20–32)
CREAT SERPL-MCNC: 0.93 MG/DL (ref 0.5–1.05)
EGFRCR SERPLBLD CKD-EPI 2021: 69 ML/MIN/1.73M2
EOSINOPHIL # BLD AUTO: 680 CELLS/UL (ref 15–500)
EOSINOPHIL NFR BLD AUTO: 8.1 %
ERYTHROCYTE [DISTWIDTH] IN BLOOD BY AUTOMATED COUNT: 13.4 % (ref 11–15)
GLUCOSE SERPL-MCNC: 94 MG/DL (ref 65–99)
HCT VFR BLD AUTO: 43.5 % (ref 35–45)
HDLC SERPL-MCNC: 62 MG/DL
HGB BLD-MCNC: 14.3 G/DL (ref 11.7–15.5)
LDLC SERPL CALC-MCNC: 64 MG/DL (CALC)
LYMPHOCYTES # BLD AUTO: 1277 CELLS/UL (ref 850–3900)
LYMPHOCYTES NFR BLD AUTO: 15.2 %
MCH RBC QN AUTO: 31.6 PG (ref 27–33)
MCHC RBC AUTO-ENTMCNC: 32.9 G/DL (ref 32–36)
MCV RBC AUTO: 96.2 FL (ref 80–100)
MONOCYTES # BLD AUTO: 571 CELLS/UL (ref 200–950)
MONOCYTES NFR BLD AUTO: 6.8 %
NEUTROPHILS # BLD AUTO: 5813 CELLS/UL (ref 1500–7800)
NEUTROPHILS NFR BLD AUTO: 69.2 %
NONHDLC SERPL-MCNC: 79 MG/DL (CALC)
PLATELET # BLD AUTO: 324 THOUSAND/UL (ref 140–400)
PMV BLD REES-ECKER: 10.1 FL (ref 7.5–12.5)
POTASSIUM SERPL-SCNC: 4.4 MMOL/L (ref 3.5–5.3)
PROT SERPL-MCNC: 7.5 G/DL (ref 6.1–8.1)
RBC # BLD AUTO: 4.52 MILLION/UL (ref 3.8–5.1)
SODIUM SERPL-SCNC: 138 MMOL/L (ref 135–146)
TRIGL SERPL-MCNC: 71 MG/DL
WBC # BLD AUTO: 8.4 THOUSAND/UL (ref 3.8–10.8)

## 2025-08-13 ENCOUNTER — APPOINTMENT (OUTPATIENT)
Dept: PRIMARY CARE | Facility: CLINIC | Age: 65
End: 2025-08-13
Payer: COMMERCIAL

## 2025-08-13 VITALS
WEIGHT: 191.6 LBS | HEART RATE: 77 BPM | SYSTOLIC BLOOD PRESSURE: 114 MMHG | TEMPERATURE: 98 F | BODY MASS INDEX: 34.49 KG/M2 | OXYGEN SATURATION: 94 % | DIASTOLIC BLOOD PRESSURE: 70 MMHG

## 2025-08-13 DIAGNOSIS — F41.8 DEPRESSION WITH ANXIETY: ICD-10-CM

## 2025-08-13 DIAGNOSIS — J45.20 MILD INTERMITTENT ASTHMA WITHOUT COMPLICATION (HHS-HCC): ICD-10-CM

## 2025-08-13 DIAGNOSIS — E78.5 HYPERLIPIDEMIA, UNSPECIFIED: ICD-10-CM

## 2025-08-13 DIAGNOSIS — J45.30 MILD PERSISTENT ASTHMA WITHOUT COMPLICATION (HHS-HCC): ICD-10-CM

## 2025-08-13 DIAGNOSIS — G47.09 OTHER INSOMNIA: Primary | ICD-10-CM

## 2025-08-13 DIAGNOSIS — E03.9 HYPOTHYROIDISM, UNSPECIFIED TYPE: ICD-10-CM

## 2025-08-13 PROCEDURE — 99214 OFFICE O/P EST MOD 30 MIN: CPT | Performed by: FAMILY MEDICINE

## 2025-08-13 PROCEDURE — 1036F TOBACCO NON-USER: CPT | Performed by: FAMILY MEDICINE

## 2025-08-13 RX ORDER — MONTELUKAST SODIUM 10 MG/1
10 TABLET ORAL NIGHTLY
Qty: 30 TABLET | Refills: 5 | Status: SHIPPED | OUTPATIENT
Start: 2025-08-13 | End: 2026-02-09

## 2025-08-13 RX ORDER — LEVOTHYROXINE SODIUM 75 UG/1
75 TABLET ORAL DAILY
Qty: 90 TABLET | Refills: 1 | Status: SHIPPED | OUTPATIENT
Start: 2025-08-13

## 2025-08-13 RX ORDER — FLUTICASONE PROPIONATE AND SALMETEROL 250; 50 UG/1; UG/1
1 POWDER RESPIRATORY (INHALATION)
Qty: 60 EACH | Refills: 3 | Status: SHIPPED | OUTPATIENT
Start: 2025-08-13

## 2025-08-13 RX ORDER — ZOLPIDEM TARTRATE 5 MG/1
5 TABLET ORAL NIGHTLY PRN
Qty: 30 TABLET | Refills: 2 | Status: SHIPPED | OUTPATIENT
Start: 2025-08-13

## 2025-08-13 RX ORDER — ROSUVASTATIN CALCIUM 20 MG/1
20 TABLET, COATED ORAL DAILY
Qty: 90 TABLET | Refills: 1 | Status: SHIPPED | OUTPATIENT
Start: 2025-08-13

## 2025-08-13 RX ORDER — ALBUTEROL SULFATE 90 UG/1
2 INHALANT RESPIRATORY (INHALATION) EVERY 4 HOURS PRN
Qty: 8.5 G | Refills: 2 | Status: SHIPPED | OUTPATIENT
Start: 2025-08-13 | End: 2026-08-13

## 2025-08-13 RX ORDER — BUPROPION HYDROCHLORIDE 150 MG/1
150 TABLET ORAL
Qty: 90 TABLET | Refills: 1 | Status: SHIPPED | OUTPATIENT
Start: 2025-08-13

## 2025-08-13 ASSESSMENT — PAIN SCALES - GENERAL: PAINLEVEL_OUTOF10: 0-NO PAIN

## 2025-08-14 ENCOUNTER — APPOINTMENT (OUTPATIENT)
Dept: PRIMARY CARE | Facility: CLINIC | Age: 65
End: 2025-08-14
Payer: COMMERCIAL

## 2025-08-20 ENCOUNTER — APPOINTMENT (OUTPATIENT)
Dept: RADIOLOGY | Facility: CLINIC | Age: 65
End: 2025-08-20
Payer: COMMERCIAL

## 2025-08-20 DIAGNOSIS — Z12.31 ENCOUNTER FOR SCREENING MAMMOGRAM FOR MALIGNANT NEOPLASM OF BREAST: ICD-10-CM

## 2025-08-20 PROCEDURE — 77067 SCR MAMMO BI INCL CAD: CPT | Performed by: RADIOLOGY

## 2025-08-20 PROCEDURE — 77063 BREAST TOMOSYNTHESIS BI: CPT

## 2025-08-20 PROCEDURE — 77063 BREAST TOMOSYNTHESIS BI: CPT | Performed by: RADIOLOGY

## 2025-09-05 ENCOUNTER — TELEPHONE (OUTPATIENT)
Dept: PRIMARY CARE | Facility: CLINIC | Age: 65
End: 2025-09-05
Payer: COMMERCIAL

## 2025-11-13 ENCOUNTER — APPOINTMENT (OUTPATIENT)
Dept: PRIMARY CARE | Facility: CLINIC | Age: 65
End: 2025-11-13
Payer: COMMERCIAL